# Patient Record
Sex: MALE | Race: WHITE | NOT HISPANIC OR LATINO | Employment: OTHER | URBAN - METROPOLITAN AREA
[De-identification: names, ages, dates, MRNs, and addresses within clinical notes are randomized per-mention and may not be internally consistent; named-entity substitution may affect disease eponyms.]

---

## 2017-06-13 ENCOUNTER — OFFICE VISIT (OUTPATIENT)
Dept: URGENT CARE | Facility: MEDICAL CENTER | Age: 52
End: 2017-06-13

## 2017-06-13 ENCOUNTER — APPOINTMENT (OUTPATIENT)
Dept: RADIOLOGY | Facility: MEDICAL CENTER | Age: 52
End: 2017-06-13
Attending: PHYSICIAN ASSISTANT

## 2017-06-13 DIAGNOSIS — M25.469 EFFUSION OF KNEE: ICD-10-CM

## 2017-06-13 PROCEDURE — G0382 LEV 3 HOSP TYPE B ED VISIT: HCPCS

## 2017-06-13 PROCEDURE — 73564 X-RAY EXAM KNEE 4 OR MORE: CPT

## 2018-08-29 ENCOUNTER — APPOINTMENT (EMERGENCY)
Dept: RADIOLOGY | Facility: HOSPITAL | Age: 53
End: 2018-08-29

## 2018-08-29 ENCOUNTER — HOSPITAL ENCOUNTER (EMERGENCY)
Facility: HOSPITAL | Age: 53
Discharge: HOME | End: 2018-08-29
Attending: EMERGENCY MEDICINE | Admitting: EMERGENCY MEDICINE

## 2018-08-29 VITALS
DIASTOLIC BLOOD PRESSURE: 74 MMHG | BODY MASS INDEX: 20.04 KG/M2 | SYSTOLIC BLOOD PRESSURE: 148 MMHG | WEIGHT: 140 LBS | HEIGHT: 70 IN | OXYGEN SATURATION: 99 % | TEMPERATURE: 98.3 F | RESPIRATION RATE: 20 BRPM | HEART RATE: 91 BPM

## 2018-08-29 DIAGNOSIS — S67.10XA CRUSH INJURY TO FINGER, INITIAL ENCOUNTER: ICD-10-CM

## 2018-08-29 DIAGNOSIS — S61.210A LACERATION OF RIGHT INDEX FINGER: Primary | ICD-10-CM

## 2018-08-29 PROCEDURE — 90471 IMMUNIZATION ADMIN: CPT

## 2018-08-29 PROCEDURE — 73140 X-RAY EXAM OF FINGER(S): CPT

## 2018-08-29 PROCEDURE — 90715 TDAP VACCINE 7 YRS/> IM: CPT | Performed by: EMERGENCY MEDICINE

## 2018-08-29 PROCEDURE — 99283 EMERGENCY DEPT VISIT LOW MDM: CPT

## 2018-08-29 RX ORDER — AMOXICILLIN AND CLAVULANATE POTASSIUM 875; 125 MG/1; MG/1
1 TABLET, FILM COATED ORAL EVERY 12 HOURS SCHEDULED
Qty: 20 TABLET | Refills: 0 | Status: SHIPPED | OUTPATIENT
Start: 2018-08-29 | End: 2018-09-08

## 2018-08-29 RX ORDER — GINSENG 100 MG
1 CAPSULE ORAL ONCE
Status: COMPLETED | OUTPATIENT
Start: 2018-08-29 | End: 2018-08-29

## 2018-08-29 RX ORDER — AMOXICILLIN AND CLAVULANATE POTASSIUM 875; 125 MG/1; MG/1
1 TABLET, FILM COATED ORAL ONCE
Status: COMPLETED | OUTPATIENT
Start: 2018-08-29 | End: 2018-08-29

## 2018-08-29 RX ORDER — BUPIVACAINE HYDROCHLORIDE 5 MG/ML
5 INJECTION, SOLUTION EPIDURAL; INTRACAUDAL ONCE
Status: COMPLETED | OUTPATIENT
Start: 2018-08-29 | End: 2018-08-29

## 2018-08-29 RX ORDER — LIDOCAINE HYDROCHLORIDE 10 MG/ML
5 INJECTION, SOLUTION EPIDURAL; INFILTRATION; INTRACAUDAL; PERINEURAL ONCE
Status: COMPLETED | OUTPATIENT
Start: 2018-08-29 | End: 2018-08-29

## 2018-08-29 RX ADMIN — BACITRACIN 1 SMALL APPLICATION: 500 OINTMENT TOPICAL at 12:51

## 2018-08-29 RX ADMIN — TETANUS TOXOID, REDUCED DIPHTHERIA TOXOID AND ACELLULAR PERTUSSIS VACCINE, ADSORBED 0.5 ML: 5; 2.5; 8; 8; 2.5 SUSPENSION INTRAMUSCULAR at 11:55

## 2018-08-29 RX ADMIN — LIDOCAINE HYDROCHLORIDE 5 ML: 10 INJECTION, SOLUTION EPIDURAL; INFILTRATION; INTRACAUDAL; PERINEURAL at 11:55

## 2018-08-29 RX ADMIN — BUPIVACAINE HYDROCHLORIDE 5 ML: 5 INJECTION, SOLUTION EPIDURAL; INTRACAUDAL at 11:56

## 2018-08-29 RX ADMIN — AMOXICILLIN AND CLAVULANATE POTASSIUM 1 TABLET: 875; 125 TABLET, FILM COATED ORAL at 12:50

## 2018-08-29 NOTE — ED PROVIDER NOTES
History  Chief Complaint   Patient presents with    Finger Laceration     Pt states he cut his R index finger while attempting to thread pipe  60-year-old male presents today complaining of a laceration on his right index finger  Finger was caught between a wrench and a machine that was threading pipe  No other injury  Occurred just prior to arrival   Tetanus status is unknown  Is not on any blood thinners  History provided by:  Patient  Finger Laceration   Location:  Finger  Finger laceration location:  R index finger  Length:  4  Bleeding: controlled with pressure    Laceration mechanism:  Metal edge  Pain details:     Quality:  Aching    Severity:  Moderate    Timing:  Constant    Progression:  Unchanged  Relieved by:  None tried  Worsened by:  Nothing  Ineffective treatments:  None tried  Tetanus status:  Out of date  Associated symptoms: no fever, no focal weakness, no numbness, no rash, no redness, no swelling and no streaking        None       History reviewed  No pertinent past medical history  History reviewed  No pertinent surgical history  History reviewed  No pertinent family history  I have reviewed and agree with the history as documented  Social History   Substance Use Topics    Smoking status: Current Every Day Smoker     Packs/day: 1 50     Types: Cigarettes    Smokeless tobacco: Never Used    Alcohol use No        Review of Systems   Constitutional: Negative for fever  Skin: Positive for wound  Negative for rash  Allergic/Immunologic: Negative for immunocompromised state  Neurological: Negative for focal weakness  Physical Exam  Physical Exam   Constitutional: He is oriented to person, place, and time  He appears well-developed and well-nourished  Musculoskeletal:   4 centimeter crescent shaped laceration on the dorsal surface of the right index finger over the PIP joint  Range of motion is limited due to pain  Neurovascularly intact distally  Neurological: He is alert and oriented to person, place, and time  Skin: Skin is warm and dry  Capillary refill takes less than 2 seconds  Psychiatric: He has a normal mood and affect  Nursing note and vitals reviewed  Vital Signs  ED Triage Vitals [08/29/18 1134]   Temperature Pulse Respirations Blood Pressure SpO2   98 3 °F (36 8 °C) 91 20 148/74 99 %      Temp Source Heart Rate Source Patient Position - Orthostatic VS BP Location FiO2 (%)   Oral Monitor Sitting Right arm --      Pain Score       4           Vitals:    08/29/18 1134   BP: 148/74   Pulse: 91   Patient Position - Orthostatic VS: Sitting       Visual Acuity      ED Medications  Medications   tetanus-diphtheria-acellular pertussis (BOOSTRIX) IM injection 0 5 mL (0 5 mL Intramuscular Given 8/29/18 1155)   lidocaine (PF) (XYLOCAINE-MPF) 1 % injection 5 mL (5 mL Infiltration Given 8/29/18 1155)   bupivacaine (PF) (MARCAINE) 0 5 % injection 5 mL (5 mL Injection Given 8/29/18 1156)   amoxicillin-clavulanate (AUGMENTIN) 875-125 mg per tablet 1 tablet (1 tablet Oral Given 8/29/18 1250)   bacitracin topical ointment 1 small application (1 small application Topical Given 8/29/18 1251)       Diagnostic Studies  Results Reviewed     None                 XR finger second digit-index RIGHT   Final Result by Duarte Weber MD (08/29 1310)      Swelling  Workstation performed: [id number]                    Procedures  Lac Repair  Date/Time: 8/29/2018 12:24 PM  Performed by: Merlyn Ormond  Authorized by: Merlyn Ormond   Consent: Verbal consent obtained    Risks and benefits: risks, benefits and alternatives were discussed  Consent given by: patient  Patient understanding: patient states understanding of the procedure being performed  Patient identity confirmed: verbally with patient  Body area: upper extremity  Location details: right index finger  Laceration length: 4 cm  Foreign bodies: no foreign bodies  Anesthesia: digital block    Anesthesia:  Local Anesthetic: bupivacaine 0 5% without epinephrine and lidocaine 1% without epinephrine    Sedation:  Patient sedated: no    Wound Dehiscence:  Superficial Wound Dehiscence: simple closure      Procedure Details:  Irrigation solution: saline  Irrigation method: syringe  Amount of cleaning: standard  Debridement: none  Degree of undermining: none  Skin closure: 5-0 nylon  Number of sutures: 5  Technique: simple  Approximation: close  Approximation difficulty: simple  Dressing: antibiotic ointment  Patient tolerance: Patient tolerated the procedure well with no immediate complications             Phone Contacts  ED Phone Contact    ED Course                               MDM  Number of Diagnoses or Management Options  Crush injury to finger, initial encounter: new and requires workup  Laceration of right index finger: new and requires workup  Diagnosis management comments: No obvious fracture seen on x-ray by my read  Due to the deep laceration as result of a crush injury over the joint will cover with antibiotics would place a splint  Will recommend follow-up with Hand surgery as an outpatient  Return in 7-10 days for suture removal if unable to secure follow-up with Hand surgery         Amount and/or Complexity of Data Reviewed  Tests in the radiology section of CPT®: ordered and reviewed    Risk of Complications, Morbidity, and/or Mortality  Presenting problems: moderate  Diagnostic procedures: moderate  Management options: moderate    Patient Progress  Patient progress: stable    CritCare Time    Disposition  Final diagnoses:   Laceration of right index finger   Crush injury to finger, initial encounter     Time reflects when diagnosis was documented in both MDM as applicable and the Disposition within this note     Time User Action Codes Description Comment    8/29/2018 12:48 PM Aron Doshi Add [K56 [id number]] Laceration of right index finger     8/29/2018 12:52 PM Minh Traore [[id number]] Crush injury to finger, initial encounter       ED Disposition     ED Disposition Condition Comment    Discharge  [address] discharge to home/self care  Condition at discharge: Stable        Follow-up Information     Follow up With Specialties Details Why Contact Info Additional Information    Pema Lai MD Orthopedic Surgery, Orthopedics Schedule an appointment as soon as possible for a visit for wound recheck/hand specialist [address] Emergency Department Emergency Medicine  For suture removal if unable to obtain appointment with hand surgeon [address] ED, [address] [place], [place], [address]          Discharge Medication List as of 8/29/2018 12:52 PM      START taking these medications    Details   amoxicillin-clavulanate (AUGMENTIN) 875-125 mg per tablet Take 1 tablet by mouth every 12 (twelve) hours for 10 days, Starting Wed 8/29/2018, Until Sat 9/8/2018, Print           No discharge procedures on file      ED Provider  Electronically Signed by           French Melgar DO  08/29/18 7246

## 2018-08-29 NOTE — DISCHARGE INSTRUCTIONS
Crush Injury   WHAT YOU NEED TO KNOW:   A crush injury happens when part of your body is trapped under a heavy object, or trapped between objects  You may have one or more broken bones  You may also have tissue damage  The damage can cause pain, numbness, and weakness  A crush injury can cause serious problems that need immediate treatment  DISCHARGE INSTRUCTIONS:   Medicines: You may need any of the following:  · Prescription pain medicine  may be given  Ask how to take this medicine safely  · Antibiotics  prevent or fight a bacterial infection  · Take your medicine as directed  Contact your healthcare provider if you think your medicine is not helping or if you have side effects  Tell him of her if you are allergic to any medicine  Keep a list of the medicines, vitamins, and herbs you take  Include the amounts, and when and why you take them  Bring the list or the pill bottles to follow-up visits  Carry your medicine list with you in case of an emergency  Call 911 for any of the following:   · You have chest pain, shortness of breath, or cannot think clearly  Return to the emergency department if:   · The skin near the injured area turns blue or white or feels cold and numb  · You feel pain that increases when you stretch or bend the injured area  · The injured area swells or feels tight or hard  · You have pale or shiny skin near your injury  · You have numbness or trouble moving your injured arm or leg  · Your wound is draining pus or smells bad  · Your pain or swelling does not go away or gets worse, even after you take medicine  · Blood soaks through your bandage or cast   Contact your healthcare provider if:   · You have questions or concerns about your condition or care  Follow up with your healthcare provider as directed: You may need more x-rays, or a cast for a broken bone  You may also need treatment for muscle, nerve, or kidney damage   Your healthcare provider may refer you to an orthopedic surgeon or other specialist  Write down your questions so you remember to ask them during your visits  Apply ice:  Ice helps decrease pain and swelling  Ice may also help decrease tissue damage  Use an ice pack, or put crushed ice in a plastic bag  Cover it with a towel  Apply it to the injured area for 20 minutes every hour, or as directed  Ask your healthcare provider how many times each day to apply ice, and for how many days  Elevate the injured area as directed: If possible, raise the area as often as you can  This will help decrease swelling and pain  Prop it on pillows to keep it elevated comfortably  Do not smoke:  Smoking can cause tissue damage and delay healing  Ask your healthcare provider for more information if you currently smoke and need help quitting  Go to therapy as directed:  A physical therapist can teach you exercises to help improve movement and strength  Physical therapy can also help decrease pain and loss of function  An occupational therapist can help you find ways to do daily activities and care for yourself  © 2017 [facility] Information is for End User's use only and may not be sold, redistributed or otherwise used for commercial purposes  All illustrations and images included in CareNotes® are the copyrighted property of [employer]  or [employer]  The above information is an  only  It is not intended as medical advice for individual conditions or treatments  Talk to your doctor, nurse or pharmacist before following any medical regimen to see if it is safe and effective for you  Finger Laceration   WHAT YOU NEED TO KNOW:   A finger laceration is a deep cut in your skin  It is often caused by a sharp object, such as a knife, or blunt force to your finger  Your blood vessels, bones, joints, tendons, or nerves may also be injured     DISCHARGE INSTRUCTIONS:   Return to the emergency department if:   · Your wound comes apart  · Blood soaks through your bandage  · You have severe pain in your finger or hand  · Your finger is pale and cold  · You have sudden trouble moving your finger  · Your swelling suddenly gets worse  · You have red streaks on your skin coming from your wound  Contact your healthcare provider or hand specialist if:   · You have new numbness or tingling  · Your finger feels warm, looks swollen or red, and is draining pus  · You have a fever  · You have questions or concerns about your condition or care  Medicines: You may  need any of the following:  · Antibiotics  help prevent a bacterial infection  · Acetaminophen  decreases pain and fever  It is available without a doctor's order  Ask how much to take and how often to take it  Follow directions  Read the labels of all other medicines you are using to see if they also contain acetaminophen, or ask your doctor or pharmacist  Acetaminophen can cause liver damage if not taken correctly  Do not use more than 4 grams (4,000 milligrams) total of acetaminophen in one day  · Prescription pain medicine  may be given  Ask your healthcare provider how to take this medicine safely  Some prescription pain medicines contain acetaminophen  Do not take other medicines that contain acetaminophen without talking to your healthcare provider  Too much acetaminophen may cause liver damage  Prescription pain medicine may cause constipation  Ask your healthcare provider how to prevent or treat constipation  · Take your medicine as directed  Contact your healthcare provider if you think your medicine is not helping or if you have side effects  Tell him or her if you are allergic to any medicine  Keep a list of the medicines, vitamins, and herbs you take  Include the amounts, and when and why you take them  Bring the list or the pill bottles to follow-up visits   Carry your medicine list with you in case of an emergency  Self-care:   · Apply ice  on your finger for 15 to 20 minutes every hour or as directed  Use an ice pack, or put crushed ice in a plastic bag  Cover it with a towel before you apply it to your skin  Ice helps prevent tissue damage and decreases swelling and pain  · Elevate  your hand above the level of your heart as often as you can  This will help decrease swelling and pain  Prop your hand on pillows or blankets to keep it elevated comfortably  · Wear your splint as directed  A splint will decrease movement and stress on your wound  The splint may help your wound heal faster  Ask your healthcare provider how to apply and remove a splint  · Apply ointments to decrease scarring  Do not apply ointments until your healthcare provider says it is okay  You may need to wait until your wound is healed  Ask which ointment to buy and how often to use it  Wound care:   · Do not get your wound wet until your healthcare provider says it is okay  Do not soak your hand in water  Do not go swimming until your healthcare provider says it is okay  When your healthcare provider says it is okay, carefully wash around the wound with soap and water  Let soap and water run over your wound  Gently pat the area dry or allow it to air dry  · Change your bandages when they get wet, dirty, or after washing  Apply new, clean bandages as directed  Do not apply elastic bandages or tape too tightly  Do not put powders or lotions on your wound  · Apply antibiotic ointment as directed  Your healthcare provider may give you antibiotic ointment to put over your wound if you have stitches  If you have Strips-Strips over your wound, let them dry up and fall off on their own  If they do not fall off within 14 days, gently remove them  If you have glue over your wound, do not remove or pick at it  If your glue comes off, do not replace it with glue that you have at home       · Check your wound every day for signs of infection  Signs of infection include swelling, redness, or pus  Follow up with your healthcare provider or hand specialist in 2 days:  Write down your questions so you remember to ask them during your visits  © 2017 [address] Information is for End User's use only and may not be sold, redistributed or otherwise used for commercial purposes  All illustrations and images included in CareNotes® are the copyrighted property of [employer]  or [employer]  The above information is an  only  It is not intended as medical advice for individual conditions or treatments  Talk to your doctor, nurse or pharmacist before following any medical regimen to see if it is safe and effective for you

## 2020-05-08 ENCOUNTER — HOSPITAL ENCOUNTER (OUTPATIENT)
Dept: RADIOLOGY | Facility: HOSPITAL | Age: 55
Discharge: HOME | End: 2020-05-08
Payer: COMMERCIAL

## 2020-05-08 ENCOUNTER — TRANSCRIBE ORDERS (OUTPATIENT)
Dept: ADMINISTRATIVE | Facility: HOSPITAL | Age: 55
End: 2020-05-08

## 2020-05-08 ENCOUNTER — APPOINTMENT (OUTPATIENT)
Dept: LAB | Facility: HOSPITAL | Age: 55
End: 2020-05-08
Payer: COMMERCIAL

## 2020-05-08 ENCOUNTER — LAB (OUTPATIENT)
Dept: LAB | Facility: HOSPITAL | Age: 55
End: 2020-05-08
Payer: COMMERCIAL

## 2020-05-08 DIAGNOSIS — R13.10 ODYNOPHAGIA: ICD-10-CM

## 2020-05-08 DIAGNOSIS — R22.1 NECK MASS: ICD-10-CM

## 2020-05-08 DIAGNOSIS — J38.3 VOCAL CORD LEUKOPLAKIA: ICD-10-CM

## 2020-05-08 DIAGNOSIS — R13.10 ODYNOPHAGIA: Primary | ICD-10-CM

## 2020-05-08 LAB
ALBUMIN SERPL BCP-MCNC: 4 G/DL (ref 3.5–5)
ANION GAP SERPL CALCULATED.3IONS-SCNC: 11 MMOL/L (ref 4–13)
BUN SERPL-MCNC: 15 MG/DL (ref 5–25)
CALCIUM ALBUM COR SERPL-MCNC: 10.4 MG/DL (ref 8.3–10.1)
CALCIUM SERPL-MCNC: 10.4 MG/DL (ref 8.3–10.1)
CALCIUM SERPL-MCNC: 10.4 MG/DL (ref 8.3–10.1)
CHLORIDE SERPL-SCNC: 105 MMOL/L (ref 100–108)
CO2 SERPL-SCNC: 29 MMOL/L (ref 21–32)
CREAT SERPL-MCNC: 1.01 MG/DL (ref 0.6–1.3)
ERYTHROCYTE [DISTWIDTH] IN BLOOD BY AUTOMATED COUNT: 14 % (ref 11.6–15.1)
GFR SERPL CREATININE-BSD FRML MDRD: 84 ML/MIN/1.73SQ M
GLUCOSE P FAST SERPL-MCNC: 104 MG/DL (ref 65–99)
HCT VFR BLD AUTO: 50.3 % (ref 36.5–49.3)
HGB BLD-MCNC: 16.3 G/DL (ref 12–17)
INR PPP: 0.92 (ref 0.84–1.19)
MCH RBC QN AUTO: 30.9 PG (ref 26.8–34.3)
MCHC RBC AUTO-ENTMCNC: 32.4 G/DL (ref 31.4–37.4)
MCV RBC AUTO: 95 FL (ref 82–98)
PLATELET # BLD AUTO: 226 THOUSANDS/UL (ref 149–390)
PMV BLD AUTO: 11.5 FL (ref 8.9–12.7)
POTASSIUM SERPL-SCNC: 4.7 MMOL/L (ref 3.5–5.3)
PROTHROMBIN TIME: 12.4 SECONDS (ref 11.6–14.5)
RBC # BLD AUTO: 5.27 MILLION/UL (ref 3.88–5.62)
SODIUM SERPL-SCNC: 145 MMOL/L (ref 136–145)
WBC # BLD AUTO: 7.3 THOUSAND/UL (ref 4.31–10.16)

## 2020-05-08 PROCEDURE — 36415 COLL VENOUS BLD VENIPUNCTURE: CPT

## 2020-05-08 PROCEDURE — 80048 BASIC METABOLIC PNL TOTAL CA: CPT

## 2020-05-08 PROCEDURE — 85027 COMPLETE CBC AUTOMATED: CPT

## 2020-05-08 PROCEDURE — 82040 ASSAY OF SERUM ALBUMIN: CPT

## 2020-05-08 PROCEDURE — 71046 X-RAY EXAM CHEST 2 VIEWS: CPT

## 2020-05-08 PROCEDURE — 85610 PROTHROMBIN TIME: CPT

## 2020-05-08 PROCEDURE — 93005 ELECTROCARDIOGRAM TRACING: CPT

## 2020-05-12 LAB
ATRIAL RATE: 94 BPM
P AXIS: 60 DEGREES
PR INTERVAL: 124 MS
QRS AXIS: 74 DEGREES
QRSD INTERVAL: 86 MS
QT INTERVAL: 340 MS
QTC INTERVAL: 425 MS
T WAVE AXIS: 52 DEGREES
VENTRICULAR RATE: 94 BPM

## 2020-05-12 PROCEDURE — 93010 ELECTROCARDIOGRAM REPORT: CPT | Performed by: INTERNAL MEDICINE

## 2020-09-13 ENCOUNTER — APPOINTMENT (EMERGENCY)
Dept: RADIOLOGY | Facility: HOSPITAL | Age: 55
End: 2020-09-13
Payer: COMMERCIAL

## 2020-09-13 ENCOUNTER — HOSPITAL ENCOUNTER (EMERGENCY)
Facility: HOSPITAL | Age: 55
Discharge: HOME | End: 2020-09-13
Attending: EMERGENCY MEDICINE
Payer: COMMERCIAL

## 2020-09-13 VITALS
DIASTOLIC BLOOD PRESSURE: 62 MMHG | OXYGEN SATURATION: 100 % | HEIGHT: 70 IN | TEMPERATURE: 98 F | WEIGHT: 140 LBS | SYSTOLIC BLOOD PRESSURE: 120 MMHG | RESPIRATION RATE: 18 BRPM | HEART RATE: 99 BPM | BODY MASS INDEX: 20.04 KG/M2

## 2020-09-13 DIAGNOSIS — S60.221A CONTUSION OF RIGHT HAND, INITIAL ENCOUNTER: Primary | ICD-10-CM

## 2020-09-13 DIAGNOSIS — M25.511 ACUTE PAIN OF RIGHT SHOULDER: ICD-10-CM

## 2020-09-13 PROCEDURE — 73030 X-RAY EXAM OF SHOULDER: CPT

## 2020-09-13 PROCEDURE — 99283 EMERGENCY DEPT VISIT LOW MDM: CPT

## 2020-09-13 PROCEDURE — 73130 X-RAY EXAM OF HAND: CPT

## 2020-09-13 PROCEDURE — 99284 EMERGENCY DEPT VISIT MOD MDM: CPT | Performed by: PHYSICIAN ASSISTANT

## 2020-09-13 RX ORDER — OXYCODONE HYDROCHLORIDE AND ACETAMINOPHEN 5; 325 MG/1; MG/1
1 TABLET ORAL EVERY 6 HOURS PRN
Qty: 15 TABLET | Refills: 0 | Status: SHIPPED | OUTPATIENT
Start: 2020-09-13 | End: 2020-09-29

## 2020-09-13 RX ORDER — MELOXICAM 15 MG/1
15 TABLET ORAL DAILY PRN
Qty: 20 TABLET | Refills: 0 | Status: SHIPPED | OUTPATIENT
Start: 2020-09-13 | End: 2020-09-29

## 2020-09-13 NOTE — ED PROVIDER NOTES
History  Chief Complaint   Patient presents with    Shoulder Injury     pt states he fell on his right shoulder a few weeks ago  pt tates he has limited ROM  he ha been taking motrin with no relief   Hand Injury     c/o hand swelling, a bruise and pain since the fall      48 y o  male with past medical history significant for throat and neck surgery presents to ED with chief complaint of right shoulder and right hand injury  Onset of symptoms reported as 1 week ago  Location of symptoms reported as right shoulder and right hand  Quality is reported as sharp pain  Severity is reported as moderate  Associated symptoms:  Denies paralysis paresthesias or weakness to the right upper extremity  Denies chest pain  Denies shortness of breath  Denies joint redness or swelling  Modifying factors: Movement at the shoulder, specifically abduction and extension exacerbate pain  Context:  Patient states 1 week ago he was pulling a vine out of a tree and was pulling on it with a lot of force and when the vine let go he fell to the ground on his right arm and right hand  He denies head injury or loss of consciousness  He denies use of blood thinners  He is right-hand dominant  He states he has been trying over-the-counter medications but states the pain in his shoulders getting worse and he is concerned that he has suffered a more severe injury  He states it is affecting his ability to work  Reviewed past medical history and visits via EPIC:  Old charts reviewed - patient last seen in ed on 08/29/2018 for evaluation of finger laceration        History provided by:  Patient   used: No    Shoulder Injury   Associated symptoms: no back pain, no fatigue, no fever and no neck pain    Hand Injury   Associated symptoms: no back pain, no fatigue, no fever and no neck pain        None       History reviewed  No pertinent past medical history      Past Surgical History:   Procedure Laterality Date    NECK SURGERY      THROAT SURGERY         History reviewed  No pertinent family history  I have reviewed and agree with the history as documented  E-Cigarette/Vaping    E-Cigarette Use Never User      E-Cigarette/Vaping Substances     Social History     Tobacco Use    Smoking status: Current Every Day Smoker     Packs/day: 1 50     Types: Cigarettes    Smokeless tobacco: Never Used   Substance Use Topics    Alcohol use: No    Drug use: No       Review of Systems   Constitutional: Negative for activity change, appetite change, chills, diaphoresis, fatigue, fever and unexpected weight change  HENT: Negative for congestion, dental problem, drooling, ear discharge, ear pain, facial swelling, hearing loss, mouth sores, nosebleeds, postnasal drip, rhinorrhea, sinus pressure, sinus pain, sneezing, sore throat, tinnitus, trouble swallowing and voice change  Eyes: Negative for photophobia, pain, discharge, redness, itching and visual disturbance  Respiratory: Negative for apnea, cough, choking, chest tightness, shortness of breath, wheezing and stridor  Cardiovascular: Negative for chest pain, palpitations and leg swelling  Gastrointestinal: Negative for abdominal distention, abdominal pain, anal bleeding, blood in stool, constipation, diarrhea, nausea and vomiting  Endocrine: Negative for cold intolerance, heat intolerance, polydipsia, polyphagia and polyuria  Genitourinary: Negative for difficulty urinating, dysuria, flank pain, frequency, hematuria and urgency  Musculoskeletal: Positive for arthralgias  Negative for back pain, joint swelling, myalgias, neck pain and neck stiffness  Skin: Negative for color change, pallor, rash and wound  Allergic/Immunologic: Negative for environmental allergies, food allergies and immunocompromised state     Neurological: Negative for dizziness, tremors, seizures, syncope, facial asymmetry, speech difficulty, weakness, light-headedness, numbness and headaches  Hematological: Negative for adenopathy  Does not bruise/bleed easily  Psychiatric/Behavioral: Negative for agitation, confusion and hallucinations  The patient is not nervous/anxious  All other systems reviewed and are negative  Physical Exam  Physical Exam  Vitals signs and nursing note reviewed  Constitutional:       General: He is not in acute distress  Appearance: Normal appearance  He is well-developed  He is not diaphoretic  Comments: /62   Pulse 99   Temp 98 °F (36 7 °C) (Oral)   Resp 18   Ht 5' 10" (1 778 m)   Wt 63 5 kg (140 lb)   SpO2 100%   BMI 20 09 kg/m²    HENT:      Head: Normocephalic and atraumatic  Right Ear: External ear normal       Left Ear: External ear normal       Nose: Nose normal  No congestion or rhinorrhea  Mouth/Throat:      Pharynx: No oropharyngeal exudate or posterior oropharyngeal erythema  Eyes:      General: No scleral icterus  Right eye: No discharge  Left eye: No discharge  Conjunctiva/sclera: Conjunctivae normal       Pupils: Pupils are equal, round, and reactive to light  Neck:      Musculoskeletal: Normal range of motion and neck supple  No neck rigidity or muscular tenderness  Thyroid: No thyromegaly  Trachea: No tracheal deviation  Cardiovascular:      Rate and Rhythm: Normal rate and regular rhythm  Pulses: Normal pulses  Pulmonary:      Effort: Pulmonary effort is normal  No respiratory distress  Breath sounds: Normal breath sounds  No stridor  No wheezing, rhonchi or rales  Chest:      Chest wall: No tenderness  Abdominal:      General: Bowel sounds are normal  There is no distension  Palpations: Abdomen is soft  There is no mass  Tenderness: There is no abdominal tenderness  There is no right CVA tenderness, left CVA tenderness, guarding or rebound  Hernia: No hernia is present     Musculoskeletal:         General: Tenderness and signs of injury present  No swelling or deformity  Right shoulder: He exhibits decreased range of motion, tenderness, bony tenderness and pain  He exhibits no swelling, no effusion, no crepitus, no deformity, no laceration, no spasm, normal pulse and normal strength  Arms:       Right hand: He exhibits tenderness, bony tenderness and swelling  He exhibits normal range of motion, normal two-point discrimination, normal capillary refill, no deformity and no laceration  Normal sensation noted  Normal strength noted  Hands:       Right lower leg: No edema  Left lower leg: No edema  Lymphadenopathy:      Cervical: No cervical adenopathy  Skin:     General: Skin is warm and dry  Capillary Refill: Capillary refill takes less than 2 seconds  Coloration: Skin is not jaundiced or pale  Findings: No bruising, erythema, lesion or rash  Neurological:      General: No focal deficit present  Mental Status: He is alert and oriented to person, place, and time  Cranial Nerves: No cranial nerve deficit  Sensory: No sensory deficit  Motor: No weakness or abnormal muscle tone  Coordination: Coordination normal       Gait: Gait normal       Deep Tendon Reflexes: Reflexes normal    Psychiatric:         Mood and Affect: Mood normal          Behavior: Behavior normal          Thought Content:  Thought content normal          Judgment: Judgment normal          Vital Signs  ED Triage Vitals [09/13/20 1014]   Temperature Pulse Respirations Blood Pressure SpO2   98 °F (36 7 °C) 100 18 127/73 99 %      Temp Source Heart Rate Source Patient Position - Orthostatic VS BP Location FiO2 (%)   Oral Monitor -- -- --      Pain Score       6           Vitals:    09/13/20 1014 09/13/20 1030   BP: 127/73 120/62   Pulse: 100 99         Visual Acuity      ED Medications  Medications - No data to display    Diagnostic Studies  Results Reviewed     None                 XR shoulder 2+ views RIGHT   Final Result by Melyssa Alcala MD (09/13 1107)      Calcific tendinitis/bursitis  No acute osseous abnormality  Workstation performed: [id number]         XR hand 3+ views RIGHT   Final Result by Melyssa Alcala MD (09/13 1109)      No acute osseous abnormality  Workstation performed: [id number]                    Procedures  Procedures         ED Course  ED Course as of Sep 13 1501   Sun Sep 13, 2020   1048 Patient drove himself to ED  Offered analgesics in ed but patient declined  SBIRT 22yo+      Most Recent Value   SBIRT (24 yo +)   In order to provide better care to our patients, we are screening all of our patients for alcohol and drug use  Would it be okay to ask you these screening questions? No Filed at: 09/13/2020 1016   Initial Alcohol Screen: US AUDIT-C    1  How often do you have a drink containing alcohol?  0 Filed at: 09/13/2020 1016   2  How many drinks containing alcohol do you have on a typical day you are drinking? 0 Filed at: 09/13/2020 1016   3a  Male UNDER 65: How often do you have five or more drinks on one occasion? 0 Filed at: 09/13/2020 1016   3b  FEMALE Any Age, or MALE 65+: How often do you have 4 or more drinks on one occassion? 0 Filed at: 09/13/2020 1016   Audit-C Score  0 Filed at: 09/13/2020 1016   ASHLEY: How many times in the past year have you    Used an illegal drug or used a prescription medication for non-medical reasons? Never Filed at: 09/13/2020 1016                  MDM  Number of Diagnoses or Management Options  Acute pain of right shoulder: new and requires workup  Contusion of right hand, initial encounter: new and requires workup  Diagnosis management comments: ddx includes but is not limited to fracture, contusion, sprain, strain, nerve injury, tendon injury, vascular injury, tendinitis, bursitis, dislocation, plan xray to rule out fracture or dislocation      Xray images right hand independently visualized and interpreted by me - no acute fracture or dislocation  Xray images right shoulder independently visualized and interpreted by me - no acute fracture or dislocation  There is no acute fracture or dislocation  Right AC mild degenerative joint     No lytic or blastic osseous lesion  Inferior glenoid soft tissue calcifications  I discussed x-ray results with the patient at bedside  Discussed with patient the in for glenoid soft tissue calcification this concerning as a source for the patient's symptoms  Discussed no fracture of the right hand  Discussed diagnosis of contusion of the right hand  Discussed diagnosis of right shoulder pain  Discussed treatment plan including rest, use of analgesic pain medications  Discussed use of NSAIDs  Discussed use of ice  Discussed follow-up with primary care physician and Sports Medicine in 2-3 days for recheck and further evaluation of symptoms  Reviewed reasons to return to ed  Patient verbalized understanding of diagnosis and agreement with discharge plan of care as well as understanding of reasons to return to ed  I have reasonably determine that electronically prescribing a controlled substance would be impractical for the patient to obtain the controlled substance prescribed by electronic prescription or would cause an untimely delay resulting in an adverse impact on the patient's medical condition   Standard narcotic precautions given            Amount and/or Complexity of Data Reviewed  Tests in the radiology section of CPT®: ordered and reviewed  Discussion of test results with the performing providers: yes  Obtain history from someone other than the patient: yes  Review and summarize past medical records: yes  Independent visualization of images, tracings, or specimens: yes    Patient Progress  Patient progress: stable      Disposition  Final diagnoses:   Contusion of right hand, initial encounter   Acute pain of right shoulder     Time reflects when diagnosis was documented in both MDM as applicable and the Disposition within this note     Time User Action Codes Description Comment    9/13/2020 11:17 AM Aysha Sargent [F02 193A] Contusion of right hand, initial encounter     9/13/2020 11:17 AM Aysha Sargent [E29 [id number]] Acute pain of right shoulder       ED Disposition     ED Disposition Condition Date/Time Comment    Discharge Stable Sun Sep 13, 2020 11:17 AM Tasia Calle discharge to home/self care  Follow-up Information     Follow up With Specialties Details Why Contact Info Additional [address] [facility] Emergency Department Emergency Medicine Go to  If symptoms worsen [address] [facility] [address] ED, [address] [place], [place], [address] Shakila York MD Family Medicine Call in 1 day for further evaluation of symptoms [address]   [address]  [facility] 41, [address] Call in 1 day for further evaluation of symptoms [address] [facility]  [address]  [facility] 89  [phone]             Discharge Medication List as of 9/13/2020 11:20 AM      START taking these medications    Details   meloxicam (MOBIC) 15 mg tablet Take 1 tablet (15 mg total) by mouth daily as needed for moderate pain (shoulder pain/initial rx ), Starting Sun 9/13/2020, Print      oxyCODONE-acetaminophen (PERCOCET) 5-325 mg per tablet Take 1 tablet by mouth every 6 (six) hours as needed for severe pain (shoulder pain/initial rx ) Label no driving no etoh  Initial rx  Dx:Max Daily Amount: 4 tablets, Starting Sun 9/13/2020, Print           No discharge procedures on file      PDMP Review     None          ED Provider  Electronically Signed by           Christofer Bruce PA-C  09/13/20 1742

## 2020-09-13 NOTE — Clinical Note
Danica Rahman was seen and treated in our emergency department on 9/13/2020             no use or right arm until cleared by orthopedics  Diagnosis:     Luisa Monzon  may return to work on return date  He may return on this date: 09/14/2020         If you have any questions or concerns, please don't hesitate to call        Angelica Terrell PA-C    ______________________________           _______________          _______________  Hospital Representative                              Date                                Time

## 2020-09-19 ENCOUNTER — TELEPHONE (OUTPATIENT)
Dept: OBGYN CLINIC | Facility: HOSPITAL | Age: 55
End: 2020-09-19

## 2020-09-19 ENCOUNTER — TELEPHONE (OUTPATIENT)
Dept: OTHER | Facility: OTHER | Age: 55
End: 2020-09-19

## 2020-09-19 ENCOUNTER — OFFICE VISIT (OUTPATIENT)
Dept: OBGYN CLINIC | Facility: CLINIC | Age: 55
End: 2020-09-19
Payer: COMMERCIAL

## 2020-09-19 VITALS
BODY MASS INDEX: 19.61 KG/M2 | HEART RATE: 90 BPM | WEIGHT: 137 LBS | SYSTOLIC BLOOD PRESSURE: 115 MMHG | HEIGHT: 70 IN | TEMPERATURE: 98.8 F | DIASTOLIC BLOOD PRESSURE: 74 MMHG

## 2020-09-19 DIAGNOSIS — Z13.89 ENCOUNTER FOR IMAGING TO SCREEN FOR METAL PRIOR TO MRI: ICD-10-CM

## 2020-09-19 DIAGNOSIS — S49.91XA INJURY OF GLENOID LABRUM, RIGHT, INITIAL ENCOUNTER: Primary | ICD-10-CM

## 2020-09-19 PROCEDURE — 99203 OFFICE O/P NEW LOW 30 MIN: CPT | Performed by: FAMILY MEDICINE

## 2020-09-19 RX ORDER — IBUPROFEN 800 MG/1
800 TABLET ORAL 3 TIMES DAILY PRN
Qty: 90 TABLET | Refills: 1 | Status: SHIPPED | OUTPATIENT
Start: 2020-09-19 | End: 2020-11-30

## 2020-09-19 RX ORDER — TRAMADOL HYDROCHLORIDE 50 MG/1
50 TABLET ORAL 2 TIMES DAILY PRN
Qty: 10 TABLET | Refills: 0 | Status: SHIPPED | OUTPATIENT
Start: 2020-09-19 | End: 2020-11-30

## 2020-09-19 RX ORDER — OXYCODONE HYDROCHLORIDE AND ACETAMINOPHEN 5; 325 MG/1; MG/1
1 TABLET ORAL 2 TIMES DAILY PRN
Qty: 10 TABLET | Refills: 0 | Status: SHIPPED | OUTPATIENT
Start: 2020-09-19 | End: 2020-09-25 | Stop reason: SDUPTHER

## 2020-09-19 NOTE — TELEPHONE ENCOUNTER
Please advise patient that Rx for Tramadol sent to pharmacy, until prior auth can be done      Thanks

## 2020-09-19 NOTE — TELEPHONE ENCOUNTER
Patient sees Dr Umair Shahid  Patients spouse Sincere Deshpande is calling in stating that he was prescribed Oxycodone 5-325 mg tablets  She is stating that the pharmacy is advising that we need to contact the patients insurance to get an authorization for this medication  Patient is not going to be given the medication until authorization is obtained, she is asking for a call back relating this        Call back# [phone]

## 2020-09-19 NOTE — TELEPHONE ENCOUNTER
PHONE# [phone]/ Casey Ontiveros from [address] calling about PT: Benita Calle/ : [phone]/ FOR medication (OXYCODONE AND TRAMADOL) authorization/ Thank you

## 2020-09-19 NOTE — PROGRESS NOTES
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 and instability following axial load injury 4 weeks ago  Discussed with patient physical exam, radiographs, impression and plan  X-rays the right shoulder noted for calcifications within the subacromial space, as well as the inferior aspect of the glenohumeral joint  He has tenderness along the trapezius and anterior aspect of the shoulder  He has range of motion limited to forward flexion of 90°, abduction 90°, and internal rotation to lumbar spine  He has 4/5 strength external rotation and supraspinatus  There is positive Thurman maneuver, positive Ogemaw's test, positive axial load and grind, and positive empty can test   Based on mechanism of injury, subsequent symptoms, and clinical exam there is concern for internal derangement the shoulder particularly labral tear and occult fracture  At this time I will refer for MRI arthrogram of the shoulder as invasive management may be warranted  He will follow up with me after getting MRI done  Subjective:   Patient ID: Halle Ma is a 48 y o  male  Chief Complaint   Patient presents with    Right Shoulder - Pain       60-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 for evaluation of right shoulder pain and instability of onset from injury more than 4 weeks ago    He reports pulling on a vine on a tree, and then following landing on his right shoulder  He had pain that was described as sudden in onset, generalized to the shoulder worse at the anterior aspect, throbbing and sharp, radiating distally along the anterior lateral aspect the upper arm, worse with movement of the arm, associated with numbness and tingling, associated clicking, and improved with resting and return to neutral position  He has history of prior labral tear from martial arts injury more than 10 years ago which he treated conservatively with formal physical therapy  After this recent injury he resumed his home exercise program and did stretching, icing, heat, ibuprofen  His symptoms persisted and he presented to emergency room at which point x-ray evaluation was noted for calcifications of the subacromial and inferior glenohumeral aspect of the shoulder  There is suspicion for soft tissue injury  He was prescribed medications for pain and referred to orthopedic care  He has had difficulty with elevating the arm above shoulder and experiences clicking and sharp pain with movement  Shoulder Pain   This is a new problem  The current episode started 1 to 4 weeks ago  The problem occurs constantly  The problem has been unchanged  Associated symptoms include arthralgias, numbness and weakness  Pertinent negatives include no abdominal pain, chest pain, chills, fever, joint swelling, rash or sore throat  Exacerbated by: Arm movement  He has tried rest, NSAIDs, oral narcotics, ice, heat, acetaminophen and position changes (Home exercise, stretching) for the symptoms  The treatment provided mild relief  The following portions of the patient's history were reviewed and updated as appropriate: He  has no past medical history on file  He  has a past surgical history that includes Throat surgery and Neck surgery  His family history is not on file  He  reports that he has been smoking cigarettes   He has been smoking about 1 50 packs per day  He has never used smokeless tobacco  He reports that he does not drink alcohol or use drugs  He has No Known Allergies       Review of Systems   Constitutional: Negative for chills and fever  HENT: Negative for sore throat  Eyes: Negative for visual disturbance  Respiratory: Negative for shortness of breath  Cardiovascular: Negative for chest pain  Gastrointestinal: Negative for abdominal pain  Genitourinary: Negative for flank pain  Musculoskeletal: Positive for arthralgias  Negative for joint swelling  Skin: Negative for rash and wound  Neurological: Positive for weakness and numbness  Hematological: Does not bruise/bleed easily  Psychiatric/Behavioral: Negative for self-injury  Objective:  Vitals:    09/19/20 0847   BP: 115/74   Pulse: 90   Temp: 98 8 °F (37 1 °C)   Weight: 62 1 kg (137 lb)   Height: 5' 10" (1 778 m)     Back Exam     Comments:    Cervical spine  -bilateral paraspinal tenderness  -limited range of motion with extension and side bending to both sides  -positive axial load  -positive Spurling's on the right      Right Hand Exam     Muscle Strength   The patient has normal right wrist strength  Other   Sensation: normal  Pulse: present      Left Hand Exam     Muscle Strength   The patient has normal left wrist strength  Other   Sensation: normal  Pulse: present      Right Elbow Exam     Tenderness   The patient is experiencing no tenderness  Range of Motion   The patient has normal right elbow ROM  Muscle Strength   The patient has normal right elbow strength (5/5 strength flexion and extension)  Other   Sensation: normal      Left Elbow Exam     Muscle Strength   Left elbow normal strength: 5/5 strength flexion and extension  Other   Sensation: normal      Right Shoulder Exam     Tenderness   The patient is experiencing tenderness in the biceps tendon (Trapezius, anterior glenohumeral)      Range of Motion   Active abduction: 90   Forward flexion: 90   Internal rotation 0 degrees: Lumbar     Muscle Strength   Abduction: 5/5   Internal rotation: 5/5   External rotation: 4/5   Supraspinatus: 4/5     Tests   Thurman test: positive    Other   Sensation: normal    Comments:  Positive empty can test  Positive Brooks's  Positive axial load and grind      Left Shoulder Exam     Muscle Strength   Abduction: 5/5     Other   Sensation: normal           Strength/Myotome Testing     Left Wrist/Hand   Normal wrist strength    Right Wrist/Hand   Normal wrist strength      Physical Exam  Vitals signs and nursing note reviewed  Constitutional:       General: He is not in acute distress  Appearance: He is well-developed  HENT:      Head: Normocephalic and atraumatic  Eyes:      Conjunctiva/sclera: Conjunctivae normal    Neck:      Trachea: No tracheal deviation  Cardiovascular:      Rate and Rhythm: Normal rate  Pulmonary:      Effort: Pulmonary effort is normal  No respiratory distress  Abdominal:      General: There is no distension  Musculoskeletal:         General: Tenderness present  Skin:     General: Skin is warm and dry  Neurological:      Mental Status: He is alert and oriented to person, place, and time  Psychiatric:         Behavior: Behavior normal          I have personally reviewed pertinent films in PACS and my interpretation is Subacromial and inferior glenohumeral calcifications

## 2020-09-21 ENCOUNTER — TRANSCRIBE ORDERS (OUTPATIENT)
Dept: ADMINISTRATIVE | Facility: HOSPITAL | Age: 55
End: 2020-09-21

## 2020-09-21 DIAGNOSIS — S49.91XA UNSPECIFIED INJURY OF RIGHT SHOULDER AND UPPER ARM, INITIAL ENCOUNTER: Primary | ICD-10-CM

## 2020-09-21 NOTE — TELEPHONE ENCOUNTER
Patient's wife stated she spoke to the pharmacy already and they stated she cannot  either medication without the prior auth  Please advise

## 2020-09-21 NOTE — TELEPHONE ENCOUNTER
Levy Burton,    Please advise patient on prior auth process in which pharmacy will send us forms to fill out, and then forms will be submitted to insurance company for their approval     Thanks

## 2020-09-24 ENCOUNTER — TELEPHONE (OUTPATIENT)
Dept: OBGYN CLINIC | Facility: CLINIC | Age: 55
End: 2020-09-24

## 2020-09-24 ENCOUNTER — HOSPITAL ENCOUNTER (OUTPATIENT)
Dept: RADIOLOGY | Facility: HOSPITAL | Age: 55
Discharge: HOME | End: 2020-09-24
Attending: FAMILY MEDICINE

## 2020-09-24 DIAGNOSIS — Z13.89 ENCOUNTER FOR IMAGING TO SCREEN FOR METAL PRIOR TO MRI: ICD-10-CM

## 2020-09-24 NOTE — TELEPHONE ENCOUNTER
I spoke with patient and she states that [employer] will not allow her to pay out of pocket while Prior Auth goes thru  I did confirm it with the pharmacy  Pharmacy state the Prior Auth has still not gone thru on their end  Patient is wondering if Dr Ernestina Rushing will send a small script of Percocet to [facility] on [address] and he will pay out of pocket for it while we wait for prior authorization    Thanks

## 2020-09-24 NOTE — TELEPHONE ENCOUNTER
Spoke to patient and he is coming in right away to fill out form  He is in a lot of pain  He stated he was here on Saturday(9/19) and was supposed to get a call from Adam Schroeder to schedule Mri and Follow up but its 5 days later and she has not contacted him

## 2020-09-24 NOTE — TELEPHONE ENCOUNTER
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

## 2020-09-24 NOTE — TELEPHONE ENCOUNTER
Dot Schwartz,    Please reach out to [employer] and have them send us prior auth form  [phone]      Thanks

## 2020-09-24 NOTE — TELEPHONE ENCOUNTER
Patient's wife Elo Daily called in very upset about the patient not getting his medication, asking why this is taking 5 days  She said something has to be done or she will look for a new doctor  Chris Steen agreed to speak with her

## 2020-09-25 ENCOUNTER — TELEPHONE (OUTPATIENT)
Dept: OBGYN CLINIC | Facility: CLINIC | Age: 55
End: 2020-09-25

## 2020-09-25 ENCOUNTER — TELEPHONE (OUTPATIENT)
Dept: OBGYN CLINIC | Facility: HOSPITAL | Age: 55
End: 2020-09-25

## 2020-09-25 DIAGNOSIS — S49.91XA INJURY OF GLENOID LABRUM, RIGHT, INITIAL ENCOUNTER: ICD-10-CM

## 2020-09-25 RX ORDER — OXYCODONE HYDROCHLORIDE AND ACETAMINOPHEN 5; 325 MG/1; MG/1
1 TABLET ORAL 2 TIMES DAILY PRN
Qty: 10 TABLET | Refills: 0 | Status: SHIPPED | OUTPATIENT
Start: 2020-09-25 | End: 2020-09-29

## 2020-09-25 NOTE — TELEPHONE ENCOUNTER
Call to [employer] they will not allow the patient to pay privately until they have the denial from his [facility]  Call to Rocio Hartley @ [phone] to let her know  I had called [employer] and that they needed the denial or a call from his [facility] before they would let them pay privately

## 2020-09-25 NOTE — TELEPHONE ENCOUNTER
Dr Haq  from [address] is asking which fax # the auth form was sent to for the Tramadol  I called Jose and petra to [employer], the paperwork wasn't faxed yet  I put [address] Flicker through with [employer] so he can fax new form to her

## 2020-09-25 NOTE — TELEPHONE ENCOUNTER
Patient made aware and will try in a few hours to see if the RX will be allowed at [facility]   He will let us know

## 2020-09-25 NOTE — TELEPHONE ENCOUNTER
Levy Amador,    Please reach out to patient and advise that Rx of percocet sent to [facility] as requested      Thanks

## 2020-09-28 ENCOUNTER — HOSPITAL ENCOUNTER (OUTPATIENT)
Dept: RADIOLOGY | Facility: HOSPITAL | Age: 55
Discharge: HOME | End: 2020-09-28
Attending: FAMILY MEDICINE
Payer: COMMERCIAL

## 2020-09-28 ENCOUNTER — HOSPITAL ENCOUNTER (OUTPATIENT)
Dept: RADIOLOGY | Facility: HOSPITAL | Age: 55
Discharge: HOME | End: 2020-09-28
Attending: FAMILY MEDICINE | Admitting: RADIOLOGY
Payer: COMMERCIAL

## 2020-09-28 DIAGNOSIS — S49.91XA UNSPECIFIED INJURY OF RIGHT SHOULDER AND UPPER ARM, INITIAL ENCOUNTER: ICD-10-CM

## 2020-09-28 DIAGNOSIS — S49.91XA INJURY OF GLENOID LABRUM, RIGHT, INITIAL ENCOUNTER: ICD-10-CM

## 2020-09-28 PROCEDURE — 23350 INJECTION FOR SHOULDER X-RAY: CPT

## 2020-09-28 PROCEDURE — 73222 MRI JOINT UPR EXTREM W/DYE: CPT

## 2020-09-28 PROCEDURE — A9585 GADOBUTROL INJECTION: HCPCS | Performed by: FAMILY MEDICINE

## 2020-09-28 PROCEDURE — G1004 CDSM NDSC: HCPCS

## 2020-09-28 PROCEDURE — 77002 NEEDLE LOCALIZATION BY XRAY: CPT

## 2020-09-28 RX ORDER — SODIUM CHLORIDE 9 MG/ML
50 INJECTION INTRAVENOUS
Status: COMPLETED | OUTPATIENT
Start: 2020-09-28 | End: 2020-09-28

## 2020-09-28 RX ORDER — LIDOCAINE HYDROCHLORIDE 10 MG/ML
10 INJECTION, SOLUTION EPIDURAL; INFILTRATION; INTRACAUDAL; PERINEURAL
Status: COMPLETED | OUTPATIENT
Start: 2020-09-28 | End: 2020-09-28

## 2020-09-28 RX ADMIN — IOHEXOL 4 ML: 300 INJECTION, SOLUTION INTRAVENOUS at 08:29

## 2020-09-28 RX ADMIN — SODIUM CHLORIDE 10 ML: 9 INJECTION, SOLUTION INTRAMUSCULAR; INTRAVENOUS; SUBCUTANEOUS at 08:30

## 2020-09-28 RX ADMIN — LIDOCAINE HYDROCHLORIDE 8 ML: 10 INJECTION, SOLUTION EPIDURAL; INFILTRATION; INTRACAUDAL; PERINEURAL at 08:30

## 2020-09-28 RX ADMIN — GADOBUTROL 2 ML: 604.72 INJECTION INTRAVENOUS at 08:30

## 2020-09-29 ENCOUNTER — OFFICE VISIT (OUTPATIENT)
Dept: OBGYN CLINIC | Facility: CLINIC | Age: 55
End: 2020-09-29
Payer: COMMERCIAL

## 2020-09-29 VITALS
WEIGHT: 137 LBS | BODY MASS INDEX: 19.61 KG/M2 | HEIGHT: 70 IN | SYSTOLIC BLOOD PRESSURE: 121 MMHG | DIASTOLIC BLOOD PRESSURE: 76 MMHG | HEART RATE: 96 BPM

## 2020-09-29 DIAGNOSIS — M67.813 TENDINOSIS OF RIGHT SHOULDER: ICD-10-CM

## 2020-09-29 DIAGNOSIS — S49.91XD INJURY OF GLENOID LABRUM, RIGHT, SUBSEQUENT ENCOUNTER: ICD-10-CM

## 2020-09-29 DIAGNOSIS — M19.011 ARTHRITIS OF RIGHT ACROMIOCLAVICULAR JOINT: ICD-10-CM

## 2020-09-29 DIAGNOSIS — M75.41 SUBACROMIAL IMPINGEMENT OF RIGHT SHOULDER: Primary | ICD-10-CM

## 2020-09-29 PROCEDURE — 20610 DRAIN/INJ JOINT/BURSA W/O US: CPT | Performed by: FAMILY MEDICINE

## 2020-09-29 PROCEDURE — 99213 OFFICE O/P EST LOW 20 MIN: CPT | Performed by: FAMILY MEDICINE

## 2020-09-29 RX ORDER — LIDOCAINE HYDROCHLORIDE 10 MG/ML
3 INJECTION, SOLUTION INFILTRATION; PERINEURAL
Status: COMPLETED | OUTPATIENT
Start: 2020-09-29 | End: 2020-09-29

## 2020-09-29 RX ORDER — OXYCODONE HYDROCHLORIDE AND ACETAMINOPHEN 5; 325 MG/1; MG/1
1 TABLET ORAL 2 TIMES DAILY PRN
Qty: 20 TABLET | Refills: 0 | Status: SHIPPED | OUTPATIENT
Start: 2020-09-29 | End: 2020-11-30

## 2020-09-29 RX ORDER — LIDOCAINE HYDROCHLORIDE 10 MG/ML
4 INJECTION, SOLUTION INFILTRATION; PERINEURAL
Status: COMPLETED | OUTPATIENT
Start: 2020-09-29 | End: 2020-09-29

## 2020-09-29 RX ORDER — TRIAMCINOLONE ACETONIDE 40 MG/ML
40 INJECTION, SUSPENSION INTRA-ARTICULAR; INTRAMUSCULAR
Status: COMPLETED | OUTPATIENT
Start: 2020-09-29 | End: 2020-09-29

## 2020-09-29 RX ADMIN — LIDOCAINE HYDROCHLORIDE 3 ML: 10 INJECTION, SOLUTION INFILTRATION; PERINEURAL at 11:34

## 2020-09-29 RX ADMIN — LIDOCAINE HYDROCHLORIDE 4 ML: 10 INJECTION, SOLUTION INFILTRATION; PERINEURAL at 11:34

## 2020-09-29 RX ADMIN — TRIAMCINOLONE ACETONIDE 40 MG: 40 INJECTION, SUSPENSION INTRA-ARTICULAR; INTRAMUSCULAR at 11:34

## 2020-09-29 NOTE — PROGRESS NOTES
Assessment/Plan:  Assessment/Plan   Diagnoses and all orders for this visit:    Subacromial impingement of right shoulder  -     Large joint arthrocentesis: R subacromial bursa  -     Ambulatory referral to Physical Therapy; Future    Injury of glenoid labrum, right, subsequent encounter  -     Ambulatory referral to Physical Therapy; Future  -     oxyCODONE-acetaminophen (PERCOCET) 5-325 mg per tablet; Take 1 tablet by mouth 2 (two) times a day as needed for moderate painMax Daily Amount: 2 tablets    Arthritis of right acromioclavicular joint  -     Ambulatory referral to Physical Therapy; Future    Tendinosis of right shoulder  -     Ambulatory referral to Physical Therapy; Future    Other orders  -     Cancel: Ambulatory referral to Orthopedic Surgery; Future        23-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 and instability of more than 6 weeks duration  Discussed with patient MRI results, impression and plan  MRI arthrogram of the right shoulder is noted for focal nondisplaced full-thickness tear at the chondral labral junction at the 10 o'clock position, moderate osteoarthritis of the [facility] joint with inferiorly directed osteophyte exerting mass effect on the supraspinatus  Clinical impression is that he may be symptomatic from impingement  I discussed treatment regimen of alternating anti-inflammatory with opiate and Tylenol, corticosteroid injection, and physical therapy to which he agreed  I administered mixture of 4 cc 1% lidocaine and 1 cc Kenalog to the right shoulder subacromial space without complication  He is to start physical therapy as soon as possible and do home exercises as directed  He will follow up in 4 weeks at which point he will be re-evaluated  If no improvement we will refer to orthopedic surgeon  Subjective:   Patient ID: Vivi Mae is a 48 y o  male    Chief Complaint   Patient presents with    Right Shoulder - Follow-up, Pain 71-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 and instability of more than 6 weeks duration following injury  He was last seen by me 10 days ago at which point he was referred for MRI arthrogram of the right shoulder  He denies any improvement in his symptoms since his last visit  He has pain described as generalized to the shoulder worse at the anterior aspect, throbbing and sharp, radiating distally along the anterior lateral aspect of the upper arm, worse with move the arm, associated with numbness and tingling, associated with clicking, and improved with resting and return to neutral position  Since last visit he has been experiencing more sensation of instability in the shoulder  Shoulder Pain   This is a new problem  The current episode started more than 1 month ago  The problem occurs constantly  The problem has been unchanged  Associated symptoms include arthralgias, numbness and weakness  Pertinent negatives include no joint swelling  Exacerbated by: Arm movement  He has tried rest, NSAIDs, oral narcotics, position changes and acetaminophen for the symptoms  The treatment provided mild relief  Review of Systems   Musculoskeletal: Positive for arthralgias  Negative for joint swelling  Neurological: Positive for weakness and numbness  Objective:  Vitals:    09/29/20 1120   BP: 121/76   Pulse: 96   Weight: 62 1 kg (137 lb)   Height: 5' 10" (1 778 m)     Ortho Exam    Physical Exam  Vitals signs and nursing note reviewed  Constitutional:       General: He is not in acute distress  Appearance: He is well-developed  HENT:      Head: Normocephalic and atraumatic  Eyes:      Conjunctiva/sclera: Conjunctivae normal    Neck:      Trachea: No tracheal deviation  Cardiovascular:      Rate and Rhythm: Normal rate  Pulmonary:      Effort: Pulmonary effort is normal  No respiratory distress     Abdominal:      General: There is no distension  Skin:     General: Skin is warm and dry  Neurological:      Mental Status: He is alert and oriented to person, place, and time  Psychiatric:         Behavior: Behavior normal          I have personally reviewed pertinent films in PACS and my interpretation is AC joint arthropathy        Large joint arthrocentesis: R subacromial bursa  Date/Time: 9/29/2020 11:34 AM  Consent given by: patient  Site marked: site marked  Timeout: Immediately prior to procedure a time out was called to verify the correct patient, procedure, equipment, support staff and site/side marked as required   Supporting Documentation  Indications: pain   Procedure Details  Location: shoulder - R subacromial bursa  Preparation: Patient was prepped and draped in the usual sterile fashion  Needle size: 22 G  Ultrasound guidance: no  Approach: anterolateral  Medications administered: 3 mL lidocaine 1 %; 4 mL lidocaine 1 %; 40 mg triamcinolone acetonide 40 mg/mL    Patient tolerance: patient tolerated the procedure well with no immediate complications  Dressing:  Sterile dressing applied

## 2020-10-15 ENCOUNTER — EVALUATION (OUTPATIENT)
Dept: PHYSICAL THERAPY | Facility: CLINIC | Age: 55
End: 2020-10-15
Payer: COMMERCIAL

## 2020-10-15 DIAGNOSIS — M75.41 SUBACROMIAL IMPINGEMENT OF RIGHT SHOULDER: Primary | ICD-10-CM

## 2020-10-15 DIAGNOSIS — M67.813 TENDINOSIS OF RIGHT SHOULDER: ICD-10-CM

## 2020-10-15 DIAGNOSIS — M19.011 ARTHRITIS OF RIGHT ACROMIOCLAVICULAR JOINT: ICD-10-CM

## 2020-10-15 DIAGNOSIS — S49.91XD INJURY OF GLENOID LABRUM, RIGHT, SUBSEQUENT ENCOUNTER: ICD-10-CM

## 2020-10-15 PROCEDURE — 97162 PT EVAL MOD COMPLEX 30 MIN: CPT | Performed by: PHYSICAL THERAPIST

## 2020-10-15 PROCEDURE — 97110 THERAPEUTIC EXERCISES: CPT | Performed by: PHYSICAL THERAPIST

## 2020-10-20 ENCOUNTER — OFFICE VISIT (OUTPATIENT)
Dept: PHYSICAL THERAPY | Facility: CLINIC | Age: 55
End: 2020-10-20
Payer: COMMERCIAL

## 2020-10-20 DIAGNOSIS — M19.011 ARTHRITIS OF RIGHT ACROMIOCLAVICULAR JOINT: ICD-10-CM

## 2020-10-20 DIAGNOSIS — M67.813 TENDINOSIS OF RIGHT SHOULDER: ICD-10-CM

## 2020-10-20 DIAGNOSIS — S49.91XD INJURY OF GLENOID LABRUM, RIGHT, SUBSEQUENT ENCOUNTER: ICD-10-CM

## 2020-10-20 DIAGNOSIS — M75.41 SUBACROMIAL IMPINGEMENT OF RIGHT SHOULDER: Primary | ICD-10-CM

## 2020-10-20 PROCEDURE — 97110 THERAPEUTIC EXERCISES: CPT

## 2020-10-20 PROCEDURE — 97112 NEUROMUSCULAR REEDUCATION: CPT

## 2020-10-22 ENCOUNTER — OFFICE VISIT (OUTPATIENT)
Dept: PHYSICAL THERAPY | Facility: CLINIC | Age: 55
End: 2020-10-22
Payer: COMMERCIAL

## 2020-10-22 DIAGNOSIS — M75.41 SUBACROMIAL IMPINGEMENT OF RIGHT SHOULDER: Primary | ICD-10-CM

## 2020-10-22 DIAGNOSIS — S49.91XD INJURY OF GLENOID LABRUM, RIGHT, SUBSEQUENT ENCOUNTER: ICD-10-CM

## 2020-10-22 DIAGNOSIS — M67.813 TENDINOSIS OF RIGHT SHOULDER: ICD-10-CM

## 2020-10-22 DIAGNOSIS — M19.011 ARTHRITIS OF RIGHT ACROMIOCLAVICULAR JOINT: ICD-10-CM

## 2020-10-22 PROCEDURE — 97110 THERAPEUTIC EXERCISES: CPT

## 2020-10-22 PROCEDURE — 97112 NEUROMUSCULAR REEDUCATION: CPT

## 2020-10-26 ENCOUNTER — APPOINTMENT (OUTPATIENT)
Dept: PHYSICAL THERAPY | Facility: CLINIC | Age: 55
End: 2020-10-26
Payer: COMMERCIAL

## 2020-10-28 ENCOUNTER — OFFICE VISIT (OUTPATIENT)
Dept: FAMILY MEDICINE CLINIC | Facility: CLINIC | Age: 55
End: 2020-10-28
Payer: COMMERCIAL

## 2020-10-28 VITALS
DIASTOLIC BLOOD PRESSURE: 52 MMHG | BODY MASS INDEX: 19.56 KG/M2 | HEIGHT: 70 IN | TEMPERATURE: 97.9 F | WEIGHT: 136.6 LBS | HEART RATE: 90 BPM | OXYGEN SATURATION: 100 % | SYSTOLIC BLOOD PRESSURE: 112 MMHG

## 2020-10-28 DIAGNOSIS — Z72.0 TOBACCO ABUSE: ICD-10-CM

## 2020-10-28 DIAGNOSIS — Z00.00 ANNUAL PHYSICAL EXAM: ICD-10-CM

## 2020-10-28 DIAGNOSIS — M25.50 ARTHRALGIA, UNSPECIFIED JOINT: Primary | ICD-10-CM

## 2020-10-28 DIAGNOSIS — Z12.11 SCREENING FOR COLON CANCER: ICD-10-CM

## 2020-10-28 DIAGNOSIS — Z23 ENCOUNTER FOR IMMUNIZATION: ICD-10-CM

## 2020-10-28 PROCEDURE — 99386 PREV VISIT NEW AGE 40-64: CPT | Performed by: STUDENT IN AN ORGANIZED HEALTH CARE EDUCATION/TRAINING PROGRAM

## 2020-10-28 PROCEDURE — 3725F SCREEN DEPRESSION PERFORMED: CPT | Performed by: STUDENT IN AN ORGANIZED HEALTH CARE EDUCATION/TRAINING PROGRAM

## 2020-10-28 PROCEDURE — 90682 RIV4 VACC RECOMBINANT DNA IM: CPT

## 2020-10-28 PROCEDURE — 90471 IMMUNIZATION ADMIN: CPT

## 2020-10-29 ENCOUNTER — OFFICE VISIT (OUTPATIENT)
Dept: PHYSICAL THERAPY | Facility: CLINIC | Age: 55
End: 2020-10-29
Payer: COMMERCIAL

## 2020-10-29 DIAGNOSIS — M75.41 SUBACROMIAL IMPINGEMENT OF RIGHT SHOULDER: Primary | ICD-10-CM

## 2020-10-29 DIAGNOSIS — M19.011 ARTHRITIS OF RIGHT ACROMIOCLAVICULAR JOINT: ICD-10-CM

## 2020-10-29 DIAGNOSIS — S49.91XD INJURY OF GLENOID LABRUM, RIGHT, SUBSEQUENT ENCOUNTER: ICD-10-CM

## 2020-10-29 DIAGNOSIS — M67.813 TENDINOSIS OF RIGHT SHOULDER: ICD-10-CM

## 2020-10-29 PROCEDURE — 97110 THERAPEUTIC EXERCISES: CPT

## 2020-10-29 PROCEDURE — 97112 NEUROMUSCULAR REEDUCATION: CPT

## 2020-11-01 ENCOUNTER — TELEPHONE (OUTPATIENT)
Dept: OTHER | Facility: OTHER | Age: 55
End: 2020-11-01

## 2020-11-12 ENCOUNTER — LAB (OUTPATIENT)
Dept: LAB | Facility: CLINIC | Age: 55
End: 2020-11-12
Payer: COMMERCIAL

## 2020-11-12 ENCOUNTER — APPOINTMENT (OUTPATIENT)
Dept: RADIOLOGY | Facility: CLINIC | Age: 55
End: 2020-11-12
Payer: COMMERCIAL

## 2020-11-12 DIAGNOSIS — M25.50 ARTHRALGIA, UNSPECIFIED JOINT: ICD-10-CM

## 2020-11-12 LAB
ANION GAP SERPL CALCULATED.3IONS-SCNC: 3 MMOL/L (ref 4–13)
BUN SERPL-MCNC: 13 MG/DL (ref 5–25)
CALCIUM SERPL-MCNC: 10.2 MG/DL (ref 8.3–10.1)
CHLORIDE SERPL-SCNC: 107 MMOL/L (ref 100–108)
CK SERPL-CCNC: 57 U/L (ref 39–308)
CO2 SERPL-SCNC: 31 MMOL/L (ref 21–32)
CREAT SERPL-MCNC: 0.81 MG/DL (ref 0.6–1.3)
CRYOGLOB RF SER-ACNC: ABNORMAL [IU]/ML
ERYTHROCYTE [SEDIMENTATION RATE] IN BLOOD: 121 MM/HOUR (ref 0–19)
GFR SERPL CREATININE-BSD FRML MDRD: 101 ML/MIN/1.73SQ M
GLUCOSE P FAST SERPL-MCNC: 120 MG/DL (ref 65–99)
POTASSIUM SERPL-SCNC: 4.2 MMOL/L (ref 3.5–5.3)
RHEUMATOID FACT SER QL LA: POSITIVE
SODIUM SERPL-SCNC: 141 MMOL/L (ref 136–145)
TSH SERPL DL<=0.05 MIU/L-ACNC: 0.67 UIU/ML (ref 0.36–3.74)
URATE SERPL-MCNC: 5.6 MG/DL (ref 4.2–8)

## 2020-11-12 PROCEDURE — 86431 RHEUMATOID FACTOR QUANT: CPT

## 2020-11-12 PROCEDURE — 84550 ASSAY OF BLOOD/URIC ACID: CPT

## 2020-11-12 PROCEDURE — 73562 X-RAY EXAM OF KNEE 3: CPT

## 2020-11-12 PROCEDURE — 86618 LYME DISEASE ANTIBODY: CPT

## 2020-11-12 PROCEDURE — 73502 X-RAY EXAM HIP UNI 2-3 VIEWS: CPT

## 2020-11-12 PROCEDURE — 80048 BASIC METABOLIC PNL TOTAL CA: CPT

## 2020-11-12 PROCEDURE — 86225 DNA ANTIBODY NATIVE: CPT

## 2020-11-12 PROCEDURE — 86430 RHEUMATOID FACTOR TEST QUAL: CPT

## 2020-11-12 PROCEDURE — 84443 ASSAY THYROID STIM HORMONE: CPT

## 2020-11-12 PROCEDURE — 85652 RBC SED RATE AUTOMATED: CPT

## 2020-11-12 PROCEDURE — 72110 X-RAY EXAM L-2 SPINE 4/>VWS: CPT

## 2020-11-12 PROCEDURE — 86038 ANTINUCLEAR ANTIBODIES: CPT

## 2020-11-12 PROCEDURE — 36415 COLL VENOUS BLD VENIPUNCTURE: CPT

## 2020-11-12 PROCEDURE — 82550 ASSAY OF CK (CPK): CPT

## 2020-11-13 LAB
B BURGDOR IGG+IGM SER-ACNC: 18
DSDNA AB SER-ACNC: 1 IU/ML (ref 0–9)
RYE IGE QN: NEGATIVE

## 2020-11-17 ENCOUNTER — HOSPITAL ENCOUNTER (EMERGENCY)
Facility: HOSPITAL | Age: 55
Discharge: HOME | End: 2020-11-17
Attending: EMERGENCY MEDICINE | Admitting: EMERGENCY MEDICINE
Payer: COMMERCIAL

## 2020-11-17 ENCOUNTER — APPOINTMENT (EMERGENCY)
Dept: RADIOLOGY | Facility: HOSPITAL | Age: 55
End: 2020-11-17
Payer: COMMERCIAL

## 2020-11-17 ENCOUNTER — TELEPHONE (OUTPATIENT)
Dept: FAMILY MEDICINE CLINIC | Facility: CLINIC | Age: 55
End: 2020-11-17

## 2020-11-17 VITALS
RESPIRATION RATE: 18 BRPM | HEART RATE: 104 BPM | DIASTOLIC BLOOD PRESSURE: 72 MMHG | SYSTOLIC BLOOD PRESSURE: 125 MMHG | HEIGHT: 70 IN | OXYGEN SATURATION: 99 % | TEMPERATURE: 98.7 F | BODY MASS INDEX: 19.6 KG/M2

## 2020-11-17 DIAGNOSIS — M25.519 SHOULDER PAIN: Primary | ICD-10-CM

## 2020-11-17 PROCEDURE — 99284 EMERGENCY DEPT VISIT MOD MDM: CPT | Performed by: EMERGENCY MEDICINE

## 2020-11-17 PROCEDURE — 99283 EMERGENCY DEPT VISIT LOW MDM: CPT

## 2020-11-17 PROCEDURE — 73030 X-RAY EXAM OF SHOULDER: CPT

## 2020-11-17 RX ORDER — CYCLOBENZAPRINE HCL 10 MG
10 TABLET ORAL 2 TIMES DAILY PRN
Qty: 20 TABLET | Refills: 0 | Status: SHIPPED | OUTPATIENT
Start: 2020-11-17 | End: 2020-11-30

## 2020-11-17 RX ORDER — CYCLOBENZAPRINE HCL 10 MG
10 TABLET ORAL ONCE
Status: COMPLETED | OUTPATIENT
Start: 2020-11-17 | End: 2020-11-17

## 2020-11-17 RX ORDER — PREDNISONE 20 MG/1
60 TABLET ORAL DAILY
Qty: 15 TABLET | Refills: 0 | Status: SHIPPED | OUTPATIENT
Start: 2020-11-17 | End: 2020-11-22

## 2020-11-17 RX ORDER — PREDNISONE 20 MG/1
60 TABLET ORAL ONCE
Status: COMPLETED | OUTPATIENT
Start: 2020-11-17 | End: 2020-11-17

## 2020-11-17 RX ADMIN — CYCLOBENZAPRINE HYDROCHLORIDE 10 MG: 10 TABLET, FILM COATED ORAL at 11:51

## 2020-11-17 RX ADMIN — PREDNISONE 60 MG: 20 TABLET ORAL at 11:51

## 2020-11-18 ENCOUNTER — DOCUMENTATION (OUTPATIENT)
Dept: FAMILY MEDICINE CLINIC | Facility: CLINIC | Age: 55
End: 2020-11-18

## 2020-11-18 ENCOUNTER — TELEPHONE (OUTPATIENT)
Dept: FAMILY MEDICINE CLINIC | Facility: CLINIC | Age: 55
End: 2020-11-18

## 2020-11-18 DIAGNOSIS — Z13.6 ENCOUNTER FOR LIPID SCREENING FOR CARDIOVASCULAR DISEASE: Primary | ICD-10-CM

## 2020-11-18 DIAGNOSIS — Z13.220 ENCOUNTER FOR LIPID SCREENING FOR CARDIOVASCULAR DISEASE: Primary | ICD-10-CM

## 2020-11-18 DIAGNOSIS — M19.90 ARTHRITIS: ICD-10-CM

## 2020-11-20 ENCOUNTER — OFFICE VISIT (OUTPATIENT)
Dept: OBGYN CLINIC | Facility: CLINIC | Age: 55
End: 2020-11-20
Payer: COMMERCIAL

## 2020-11-20 VITALS
BODY MASS INDEX: 19.04 KG/M2 | HEART RATE: 108 BPM | DIASTOLIC BLOOD PRESSURE: 82 MMHG | SYSTOLIC BLOOD PRESSURE: 120 MMHG | WEIGHT: 133 LBS | HEIGHT: 70 IN

## 2020-11-20 DIAGNOSIS — M19.011 ARTHRITIS OF RIGHT ACROMIOCLAVICULAR JOINT: ICD-10-CM

## 2020-11-20 DIAGNOSIS — M75.41 SUBACROMIAL IMPINGEMENT OF RIGHT SHOULDER: Primary | ICD-10-CM

## 2020-11-20 DIAGNOSIS — M62.838 TRAPEZIUS MUSCLE SPASM: ICD-10-CM

## 2020-11-20 PROCEDURE — 4004F PT TOBACCO SCREEN RCVD TLK: CPT | Performed by: FAMILY MEDICINE

## 2020-11-20 PROCEDURE — 99213 OFFICE O/P EST LOW 20 MIN: CPT | Performed by: FAMILY MEDICINE

## 2020-11-20 RX ORDER — TIZANIDINE 4 MG/1
4 TABLET ORAL 3 TIMES DAILY PRN
Qty: 90 TABLET | Refills: 1 | Status: SHIPPED | OUTPATIENT
Start: 2020-11-20 | End: 2020-11-30

## 2020-11-30 ENCOUNTER — OFFICE VISIT (OUTPATIENT)
Dept: OBGYN CLINIC | Facility: CLINIC | Age: 55
End: 2020-11-30
Payer: COMMERCIAL

## 2020-11-30 VITALS
SYSTOLIC BLOOD PRESSURE: 106 MMHG | DIASTOLIC BLOOD PRESSURE: 74 MMHG | BODY MASS INDEX: 18.84 KG/M2 | HEIGHT: 70 IN | WEIGHT: 131.6 LBS | HEART RATE: 101 BPM | RESPIRATION RATE: 18 BRPM

## 2020-11-30 DIAGNOSIS — Z01.818 ENCOUNTER FOR PREADMISSION TESTING: Primary | ICD-10-CM

## 2020-11-30 DIAGNOSIS — M75.41 SUBACROMIAL IMPINGEMENT OF RIGHT SHOULDER: ICD-10-CM

## 2020-11-30 DIAGNOSIS — M25.511 ACUTE PAIN OF RIGHT SHOULDER: ICD-10-CM

## 2020-11-30 DIAGNOSIS — M75.21 BICEPS TENDINITIS OF RIGHT SHOULDER: ICD-10-CM

## 2020-11-30 DIAGNOSIS — M75.41 SUBACROMIAL IMPINGEMENT OF RIGHT SHOULDER: Primary | ICD-10-CM

## 2020-11-30 DIAGNOSIS — M19.011 ARTHRITIS OF RIGHT ACROMIOCLAVICULAR JOINT: ICD-10-CM

## 2020-11-30 DIAGNOSIS — M75.81 ROTATOR CUFF TENDINITIS, RIGHT: ICD-10-CM

## 2020-11-30 PROCEDURE — 99213 OFFICE O/P EST LOW 20 MIN: CPT | Performed by: ORTHOPAEDIC SURGERY

## 2020-11-30 PROCEDURE — 3008F BODY MASS INDEX DOCD: CPT | Performed by: ORTHOPAEDIC SURGERY

## 2020-11-30 PROCEDURE — 4004F PT TOBACCO SCREEN RCVD TLK: CPT | Performed by: ORTHOPAEDIC SURGERY

## 2020-11-30 RX ORDER — TRAMADOL HYDROCHLORIDE 50 MG/1
50 TABLET ORAL 2 TIMES DAILY PRN
Qty: 20 TABLET | Refills: 0 | Status: SHIPPED | OUTPATIENT
Start: 2020-11-30 | End: 2020-12-04 | Stop reason: HOSPADM

## 2020-11-30 RX ORDER — CHLORHEXIDINE GLUCONATE 4 G/100ML
SOLUTION TOPICAL DAILY PRN
Status: CANCELLED | OUTPATIENT
Start: 2020-12-04

## 2020-11-30 RX ORDER — CEFAZOLIN SODIUM 1 G/50ML
1000 SOLUTION INTRAVENOUS ONCE
Status: CANCELLED | OUTPATIENT
Start: 2020-12-04 | End: 2020-11-30

## 2020-12-01 ENCOUNTER — ANESTHESIA EVENT (OUTPATIENT)
Dept: PERIOP | Facility: HOSPITAL | Age: 55
End: 2020-12-01
Payer: COMMERCIAL

## 2020-12-01 ENCOUNTER — LAB (OUTPATIENT)
Dept: LAB | Facility: HOSPITAL | Age: 55
End: 2020-12-01
Attending: ORTHOPAEDIC SURGERY
Payer: COMMERCIAL

## 2020-12-01 ENCOUNTER — HOSPITAL ENCOUNTER (OUTPATIENT)
Dept: RADIOLOGY | Facility: HOSPITAL | Age: 55
Discharge: HOME | End: 2020-12-01
Attending: ORTHOPAEDIC SURGERY
Payer: COMMERCIAL

## 2020-12-01 DIAGNOSIS — Z01.818 ENCOUNTER FOR PREADMISSION TESTING: ICD-10-CM

## 2020-12-01 DIAGNOSIS — M75.41 SUBACROMIAL IMPINGEMENT OF RIGHT SHOULDER: ICD-10-CM

## 2020-12-01 LAB
ATRIAL RATE: 81 BPM
BASOPHILS # BLD AUTO: 0.02 THOUSANDS/ΜL (ref 0–0.1)
BASOPHILS NFR BLD AUTO: 0 % (ref 0–1)
EOSINOPHIL # BLD AUTO: 0.11 THOUSAND/ΜL (ref 0–0.61)
EOSINOPHIL NFR BLD AUTO: 1 % (ref 0–6)
ERYTHROCYTE [DISTWIDTH] IN BLOOD BY AUTOMATED COUNT: 14.1 % (ref 11.6–15.1)
HCT VFR BLD AUTO: 44.1 % (ref 36.5–49.3)
HGB BLD-MCNC: 14.1 G/DL (ref 12–17)
IMM GRANULOCYTES # BLD AUTO: 0.03 THOUSAND/UL (ref 0–0.2)
IMM GRANULOCYTES NFR BLD AUTO: 0 % (ref 0–2)
LYMPHOCYTES # BLD AUTO: 3.18 THOUSANDS/ΜL (ref 0.6–4.47)
LYMPHOCYTES NFR BLD AUTO: 27 % (ref 14–44)
MCH RBC QN AUTO: 30.3 PG (ref 26.8–34.3)
MCHC RBC AUTO-ENTMCNC: 32 G/DL (ref 31.4–37.4)
MCV RBC AUTO: 95 FL (ref 82–98)
MONOCYTES # BLD AUTO: 0.76 THOUSAND/ΜL (ref 0.17–1.22)
MONOCYTES NFR BLD AUTO: 6 % (ref 4–12)
NEUTROPHILS # BLD AUTO: 7.78 THOUSANDS/ΜL (ref 1.85–7.62)
NEUTS SEG NFR BLD AUTO: 66 % (ref 43–75)
NRBC BLD AUTO-RTO: 0 /100 WBCS
P AXIS: 62 DEGREES
PLATELET # BLD AUTO: 305 THOUSANDS/UL (ref 149–390)
PMV BLD AUTO: 10.3 FL (ref 8.9–12.7)
PR INTERVAL: 124 MS
QRS AXIS: 74 DEGREES
QRSD INTERVAL: 90 MS
QT INTERVAL: 356 MS
QTC INTERVAL: 413 MS
RBC # BLD AUTO: 4.66 MILLION/UL (ref 3.88–5.62)
T WAVE AXIS: 57 DEGREES
VENTRICULAR RATE: 81 BPM
WBC # BLD AUTO: 11.88 THOUSAND/UL (ref 4.31–10.16)

## 2020-12-01 PROCEDURE — 93010 ELECTROCARDIOGRAM REPORT: CPT | Performed by: INTERNAL MEDICINE

## 2020-12-01 PROCEDURE — 85025 COMPLETE CBC W/AUTO DIFF WBC: CPT

## 2020-12-01 PROCEDURE — 93005 ELECTROCARDIOGRAM TRACING: CPT

## 2020-12-01 PROCEDURE — 71046 X-RAY EXAM CHEST 2 VIEWS: CPT

## 2020-12-01 PROCEDURE — 36415 COLL VENOUS BLD VENIPUNCTURE: CPT

## 2020-12-03 DIAGNOSIS — M75.81 ROTATOR CUFF TENDINITIS, RIGHT: Primary | ICD-10-CM

## 2020-12-04 ENCOUNTER — ANESTHESIA (OUTPATIENT)
Dept: PERIOP | Facility: HOSPITAL | Age: 55
End: 2020-12-04
Payer: COMMERCIAL

## 2020-12-04 ENCOUNTER — HOSPITAL ENCOUNTER (OUTPATIENT)
Facility: HOSPITAL | Age: 55
Setting detail: OUTPATIENT SURGERY
Discharge: HOME | End: 2020-12-04
Attending: ORTHOPAEDIC SURGERY | Admitting: ORTHOPAEDIC SURGERY
Payer: COMMERCIAL

## 2020-12-04 VITALS
HEIGHT: 70 IN | BODY MASS INDEX: 18.4 KG/M2 | HEART RATE: 68 BPM | OXYGEN SATURATION: 94 % | SYSTOLIC BLOOD PRESSURE: 103 MMHG | WEIGHT: 128.53 LBS | RESPIRATION RATE: 13 BRPM | TEMPERATURE: 98.1 F | DIASTOLIC BLOOD PRESSURE: 67 MMHG

## 2020-12-04 VITALS — HEART RATE: 77 BPM

## 2020-12-04 DIAGNOSIS — M75.41 IMPINGEMENT SYNDROME OF RIGHT SHOULDER: Primary | ICD-10-CM

## 2020-12-04 DIAGNOSIS — M75.21 BICEPS TENDINITIS ON RIGHT: Chronic | ICD-10-CM

## 2020-12-04 PROCEDURE — C9290 INJ, BUPIVACAINE LIPOSOME: HCPCS | Performed by: ANESTHESIOLOGY

## 2020-12-04 PROCEDURE — 29823 SHO ARTHRS SRG XTNSV DBRDMT: CPT | Performed by: PHYSICIAN ASSISTANT

## 2020-12-04 PROCEDURE — 29826 SHO ARTHRS SRG DECOMPRESSION: CPT | Performed by: ORTHOPAEDIC SURGERY

## 2020-12-04 PROCEDURE — 29823 SHO ARTHRS SRG XTNSV DBRDMT: CPT | Performed by: ORTHOPAEDIC SURGERY

## 2020-12-04 PROCEDURE — 23430 REPAIR BICEPS TENDON: CPT | Performed by: ORTHOPAEDIC SURGERY

## 2020-12-04 PROCEDURE — 29824 SHO ARTHRS SRG DSTL CLAVICLC: CPT | Performed by: PHYSICIAN ASSISTANT

## 2020-12-04 PROCEDURE — 29826 SHO ARTHRS SRG DECOMPRESSION: CPT | Performed by: PHYSICIAN ASSISTANT

## 2020-12-04 PROCEDURE — 29824 SHO ARTHRS SRG DSTL CLAVICLC: CPT | Performed by: ORTHOPAEDIC SURGERY

## 2020-12-04 PROCEDURE — 23430 REPAIR BICEPS TENDON: CPT | Performed by: PHYSICIAN ASSISTANT

## 2020-12-04 DEVICE — PROXIMAL TENODESIS IMPLANT SYSTEM REV: 0
Type: IMPLANTABLE DEVICE | Site: SHOULDER | Status: FUNCTIONAL
Brand: ARTHREX®

## 2020-12-04 RX ORDER — PROPOFOL 10 MG/ML
INJECTION, EMULSION INTRAVENOUS AS NEEDED
Status: DISCONTINUED | OUTPATIENT
Start: 2020-12-04 | End: 2020-12-04

## 2020-12-04 RX ORDER — FENTANYL CITRATE 50 UG/ML
INJECTION, SOLUTION INTRAMUSCULAR; INTRAVENOUS AS NEEDED
Status: DISCONTINUED | OUTPATIENT
Start: 2020-12-04 | End: 2020-12-04

## 2020-12-04 RX ORDER — SODIUM CHLORIDE, SODIUM LACTATE, POTASSIUM CHLORIDE, CALCIUM CHLORIDE 600; 310; 30; 20 MG/100ML; MG/100ML; MG/100ML; MG/100ML
50 INJECTION, SOLUTION INTRAVENOUS CONTINUOUS
Status: DISCONTINUED | OUTPATIENT
Start: 2020-12-04 | End: 2020-12-04 | Stop reason: HOSPADM

## 2020-12-04 RX ORDER — METOCLOPRAMIDE HYDROCHLORIDE 5 MG/ML
10 INJECTION INTRAMUSCULAR; INTRAVENOUS ONCE AS NEEDED
Status: DISCONTINUED | OUTPATIENT
Start: 2020-12-04 | End: 2020-12-04 | Stop reason: HOSPADM

## 2020-12-04 RX ORDER — MIDAZOLAM HYDROCHLORIDE 2 MG/2ML
INJECTION, SOLUTION INTRAMUSCULAR; INTRAVENOUS AS NEEDED
Status: DISCONTINUED | OUTPATIENT
Start: 2020-12-04 | End: 2020-12-04

## 2020-12-04 RX ORDER — ROCURONIUM BROMIDE 10 MG/ML
INJECTION, SOLUTION INTRAVENOUS AS NEEDED
Status: DISCONTINUED | OUTPATIENT
Start: 2020-12-04 | End: 2020-12-04

## 2020-12-04 RX ORDER — ONDANSETRON 2 MG/ML
4 INJECTION INTRAMUSCULAR; INTRAVENOUS ONCE
Status: DISCONTINUED | OUTPATIENT
Start: 2020-12-04 | End: 2020-12-04 | Stop reason: HOSPADM

## 2020-12-04 RX ORDER — NEOSTIGMINE METHYLSULFATE 1 MG/ML
INJECTION INTRAVENOUS AS NEEDED
Status: DISCONTINUED | OUTPATIENT
Start: 2020-12-04 | End: 2020-12-04

## 2020-12-04 RX ORDER — SUCCINYLCHOLINE/SOD CL,ISO/PF 100 MG/5ML
SYRINGE (ML) INTRAVENOUS AS NEEDED
Status: DISCONTINUED | OUTPATIENT
Start: 2020-12-04 | End: 2020-12-04

## 2020-12-04 RX ORDER — GLYCOPYRROLATE 0.2 MG/ML
INJECTION INTRAMUSCULAR; INTRAVENOUS AS NEEDED
Status: DISCONTINUED | OUTPATIENT
Start: 2020-12-04 | End: 2020-12-04

## 2020-12-04 RX ORDER — MAGNESIUM HYDROXIDE 1200 MG/15ML
LIQUID ORAL AS NEEDED
Status: DISCONTINUED | OUTPATIENT
Start: 2020-12-04 | End: 2020-12-04 | Stop reason: HOSPADM

## 2020-12-04 RX ORDER — DEXAMETHASONE SODIUM PHOSPHATE 10 MG/ML
INJECTION, SOLUTION INTRAMUSCULAR; INTRAVENOUS AS NEEDED
Status: DISCONTINUED | OUTPATIENT
Start: 2020-12-04 | End: 2020-12-04

## 2020-12-04 RX ORDER — FENTANYL CITRATE/PF 50 MCG/ML
50 SYRINGE (ML) INJECTION
Status: DISCONTINUED | OUTPATIENT
Start: 2020-12-04 | End: 2020-12-04 | Stop reason: HOSPADM

## 2020-12-04 RX ORDER — SODIUM CHLORIDE, SODIUM LACTATE, POTASSIUM CHLORIDE, CALCIUM CHLORIDE 600; 310; 30; 20 MG/100ML; MG/100ML; MG/100ML; MG/100ML
INJECTION, SOLUTION INTRAVENOUS CONTINUOUS PRN
Status: DISCONTINUED | OUTPATIENT
Start: 2020-12-04 | End: 2020-12-04

## 2020-12-04 RX ORDER — ONDANSETRON 2 MG/ML
INJECTION INTRAMUSCULAR; INTRAVENOUS AS NEEDED
Status: DISCONTINUED | OUTPATIENT
Start: 2020-12-04 | End: 2020-12-04

## 2020-12-04 RX ORDER — ONDANSETRON 2 MG/ML
4 INJECTION INTRAMUSCULAR; INTRAVENOUS ONCE AS NEEDED
Status: DISCONTINUED | OUTPATIENT
Start: 2020-12-04 | End: 2020-12-04 | Stop reason: HOSPADM

## 2020-12-04 RX ORDER — CEFAZOLIN SODIUM 1 G/3ML
INJECTION, POWDER, FOR SOLUTION INTRAMUSCULAR; INTRAVENOUS AS NEEDED
Status: DISCONTINUED | OUTPATIENT
Start: 2020-12-04 | End: 2020-12-04

## 2020-12-04 RX ORDER — PROMETHAZINE HYDROCHLORIDE 25 MG/ML
12.5 INJECTION, SOLUTION INTRAMUSCULAR; INTRAVENOUS ONCE AS NEEDED
Status: DISCONTINUED | OUTPATIENT
Start: 2020-12-04 | End: 2020-12-04 | Stop reason: HOSPADM

## 2020-12-04 RX ORDER — CHLORHEXIDINE GLUCONATE 4 G/100ML
SOLUTION TOPICAL DAILY PRN
Status: DISCONTINUED | OUTPATIENT
Start: 2020-12-04 | End: 2020-12-04 | Stop reason: HOSPADM

## 2020-12-04 RX ORDER — OXYCODONE HYDROCHLORIDE AND ACETAMINOPHEN 5; 325 MG/1; MG/1
1 TABLET ORAL EVERY 4 HOURS PRN
Qty: 20 TABLET | Refills: 0 | Status: SHIPPED | OUTPATIENT
Start: 2020-12-04 | End: 2021-01-29

## 2020-12-04 RX ORDER — ALBUTEROL SULFATE 2.5 MG/3ML
2.5 SOLUTION RESPIRATORY (INHALATION) ONCE AS NEEDED
Status: DISCONTINUED | OUTPATIENT
Start: 2020-12-04 | End: 2020-12-04 | Stop reason: HOSPADM

## 2020-12-04 RX ORDER — OXYCODONE HYDROCHLORIDE AND ACETAMINOPHEN 5; 325 MG/1; MG/1
1 TABLET ORAL ONCE
Status: COMPLETED | OUTPATIENT
Start: 2020-12-04 | End: 2020-12-04

## 2020-12-04 RX ORDER — HYDROMORPHONE HCL/PF 1 MG/ML
0.4 SYRINGE (ML) INJECTION
Status: DISCONTINUED | OUTPATIENT
Start: 2020-12-04 | End: 2020-12-04 | Stop reason: HOSPADM

## 2020-12-04 RX ORDER — CEFAZOLIN SODIUM 1 G/50ML
1000 SOLUTION INTRAVENOUS ONCE
Status: COMPLETED | OUTPATIENT
Start: 2020-12-04 | End: 2020-12-04

## 2020-12-04 RX ORDER — LIDOCAINE HYDROCHLORIDE 10 MG/ML
INJECTION, SOLUTION EPIDURAL; INFILTRATION; INTRACAUDAL; PERINEURAL AS NEEDED
Status: DISCONTINUED | OUTPATIENT
Start: 2020-12-04 | End: 2020-12-04

## 2020-12-04 RX ADMIN — DEXAMETHASONE SODIUM PHOSPHATE 8 MG: 10 INJECTION, SOLUTION INTRAMUSCULAR; INTRAVENOUS at 13:05

## 2020-12-04 RX ADMIN — Medication 100 MG: at 12:56

## 2020-12-04 RX ADMIN — PHENYLEPHRINE HYDROCHLORIDE 40 MCG/MIN: 10 INJECTION INTRAVENOUS at 13:04

## 2020-12-04 RX ADMIN — FENTANYL CITRATE 25 MCG: 50 INJECTION, SOLUTION INTRAMUSCULAR; INTRAVENOUS at 14:33

## 2020-12-04 RX ADMIN — GLYCOPYRROLATE 0.2 MG: 0.2 INJECTION, SOLUTION INTRAMUSCULAR; INTRAVENOUS at 14:52

## 2020-12-04 RX ADMIN — FENTANYL CITRATE 100 MCG: 50 INJECTION, SOLUTION INTRAMUSCULAR; INTRAVENOUS at 12:56

## 2020-12-04 RX ADMIN — ONDANSETRON 4 MG: 2 INJECTION INTRAMUSCULAR; INTRAVENOUS at 13:05

## 2020-12-04 RX ADMIN — ROCURONIUM BROMIDE 50 MG: 10 SOLUTION INTRAVENOUS at 13:05

## 2020-12-04 RX ADMIN — FENTANYL CITRATE 25 MCG: 50 INJECTION, SOLUTION INTRAMUSCULAR; INTRAVENOUS at 14:57

## 2020-12-04 RX ADMIN — MIDAZOLAM HYDROCHLORIDE 2 MG: 1 INJECTION, SOLUTION INTRAMUSCULAR; INTRAVENOUS at 12:52

## 2020-12-04 RX ADMIN — OXYCODONE HYDROCHLORIDE AND ACETAMINOPHEN 1 TABLET: 5; 325 TABLET ORAL at 16:16

## 2020-12-04 RX ADMIN — SODIUM CHLORIDE, SODIUM LACTATE, POTASSIUM CHLORIDE, AND CALCIUM CHLORIDE 50 ML/HR: .6; .31; .03; .02 INJECTION, SOLUTION INTRAVENOUS at 15:08

## 2020-12-04 RX ADMIN — BUPIVACAINE 10 ML: 13.3 INJECTION, SUSPENSION, LIPOSOMAL INFILTRATION at 10:22

## 2020-12-04 RX ADMIN — PROPOFOL 200 MG: 10 INJECTION, EMULSION INTRAVENOUS at 12:56

## 2020-12-04 RX ADMIN — CEFAZOLIN 1000 MG: 1 INJECTION, POWDER, FOR SOLUTION INTRAVENOUS at 13:18

## 2020-12-04 RX ADMIN — FENTANYL CITRATE 50 MCG: 50 INJECTION, SOLUTION INTRAMUSCULAR; INTRAVENOUS at 15:07

## 2020-12-04 RX ADMIN — NEOSTIGMINE METHYLSULFATE 3 MG: 1 INJECTION, SOLUTION INTRAVENOUS at 14:52

## 2020-12-04 RX ADMIN — CEFAZOLIN SODIUM 1000 MG: 1 SOLUTION INTRAVENOUS at 12:18

## 2020-12-04 RX ADMIN — LIDOCAINE HYDROCHLORIDE 50 MG: 10 INJECTION, SOLUTION EPIDURAL; INFILTRATION; INTRACAUDAL; PERINEURAL at 12:56

## 2020-12-04 RX ADMIN — SODIUM CHLORIDE, SODIUM LACTATE, POTASSIUM CHLORIDE, AND CALCIUM CHLORIDE: .6; .31; .03; .02 INJECTION, SOLUTION INTRAVENOUS at 12:52

## 2020-12-09 ENCOUNTER — TELEPHONE (OUTPATIENT)
Dept: OBGYN CLINIC | Facility: HOSPITAL | Age: 55
End: 2020-12-09

## 2020-12-10 ENCOUNTER — OFFICE VISIT (OUTPATIENT)
Dept: OBGYN CLINIC | Facility: CLINIC | Age: 55
End: 2020-12-10

## 2020-12-10 ENCOUNTER — APPOINTMENT (OUTPATIENT)
Dept: RADIOLOGY | Facility: CLINIC | Age: 55
End: 2020-12-10
Payer: COMMERCIAL

## 2020-12-10 VITALS
HEIGHT: 70 IN | BODY MASS INDEX: 18.96 KG/M2 | HEART RATE: 89 BPM | WEIGHT: 132.4 LBS | DIASTOLIC BLOOD PRESSURE: 70 MMHG | RESPIRATION RATE: 20 BRPM | SYSTOLIC BLOOD PRESSURE: 105 MMHG

## 2020-12-10 DIAGNOSIS — Z48.89 AFTERCARE FOLLOWING SURGERY: ICD-10-CM

## 2020-12-10 DIAGNOSIS — M75.41 SUBACROMIAL IMPINGEMENT OF RIGHT SHOULDER: ICD-10-CM

## 2020-12-10 DIAGNOSIS — Z98.890 S/P ARTHROSCOPY OF RIGHT SHOULDER: Primary | ICD-10-CM

## 2020-12-10 PROCEDURE — 3008F BODY MASS INDEX DOCD: CPT | Performed by: ORTHOPAEDIC SURGERY

## 2020-12-10 PROCEDURE — 73030 X-RAY EXAM OF SHOULDER: CPT

## 2020-12-10 PROCEDURE — 99024 POSTOP FOLLOW-UP VISIT: CPT | Performed by: ORTHOPAEDIC SURGERY

## 2020-12-10 RX ORDER — OXYCODONE HYDROCHLORIDE AND ACETAMINOPHEN 5; 325 MG/1; MG/1
1 TABLET ORAL EVERY 6 HOURS PRN
Qty: 30 TABLET | Refills: 0 | Status: SHIPPED | OUTPATIENT
Start: 2020-12-10 | End: 2021-01-29

## 2020-12-16 ENCOUNTER — EVALUATION (OUTPATIENT)
Dept: PHYSICAL THERAPY | Facility: CLINIC | Age: 55
End: 2020-12-16
Payer: COMMERCIAL

## 2020-12-16 ENCOUNTER — TELEPHONE (OUTPATIENT)
Dept: OBGYN CLINIC | Facility: HOSPITAL | Age: 55
End: 2020-12-16

## 2020-12-16 DIAGNOSIS — M75.41 SUBACROMIAL IMPINGEMENT OF RIGHT SHOULDER: ICD-10-CM

## 2020-12-16 DIAGNOSIS — Z98.890 S/P ARTHROSCOPY OF RIGHT SHOULDER: Primary | ICD-10-CM

## 2020-12-16 PROCEDURE — 97162 PT EVAL MOD COMPLEX 30 MIN: CPT | Performed by: PHYSICAL THERAPIST

## 2020-12-16 PROCEDURE — 97110 THERAPEUTIC EXERCISES: CPT | Performed by: PHYSICAL THERAPIST

## 2020-12-17 DIAGNOSIS — Z98.890 S/P ARTHROSCOPY OF SHOULDER: Primary | ICD-10-CM

## 2020-12-17 RX ORDER — TRAMADOL HYDROCHLORIDE 50 MG/1
50 TABLET ORAL EVERY 8 HOURS PRN
Qty: 30 TABLET | Refills: 0 | Status: SHIPPED | OUTPATIENT
Start: 2020-12-17 | End: 2021-01-29

## 2020-12-22 ENCOUNTER — OFFICE VISIT (OUTPATIENT)
Dept: PHYSICAL THERAPY | Facility: CLINIC | Age: 55
End: 2020-12-22
Payer: COMMERCIAL

## 2020-12-22 DIAGNOSIS — M75.41 SUBACROMIAL IMPINGEMENT OF RIGHT SHOULDER: ICD-10-CM

## 2020-12-22 DIAGNOSIS — Z98.890 S/P ARTHROSCOPY OF RIGHT SHOULDER: Primary | ICD-10-CM

## 2020-12-22 PROCEDURE — 97110 THERAPEUTIC EXERCISES: CPT

## 2020-12-24 ENCOUNTER — OFFICE VISIT (OUTPATIENT)
Dept: PHYSICAL THERAPY | Facility: CLINIC | Age: 55
End: 2020-12-24
Payer: COMMERCIAL

## 2020-12-24 DIAGNOSIS — Z98.890 S/P ARTHROSCOPY OF RIGHT SHOULDER: Primary | ICD-10-CM

## 2020-12-24 DIAGNOSIS — M75.41 SUBACROMIAL IMPINGEMENT OF RIGHT SHOULDER: ICD-10-CM

## 2020-12-24 PROCEDURE — 97110 THERAPEUTIC EXERCISES: CPT | Performed by: PHYSICAL THERAPIST

## 2020-12-29 ENCOUNTER — CONSULT (OUTPATIENT)
Dept: RHEUMATOLOGY | Facility: CLINIC | Age: 55
End: 2020-12-29
Payer: COMMERCIAL

## 2020-12-29 ENCOUNTER — OFFICE VISIT (OUTPATIENT)
Dept: PHYSICAL THERAPY | Facility: CLINIC | Age: 55
End: 2020-12-29
Payer: COMMERCIAL

## 2020-12-29 VITALS
BODY MASS INDEX: 19.33 KG/M2 | SYSTOLIC BLOOD PRESSURE: 130 MMHG | HEIGHT: 70 IN | HEART RATE: 118 BPM | DIASTOLIC BLOOD PRESSURE: 86 MMHG | WEIGHT: 135 LBS | RESPIRATION RATE: 18 BRPM

## 2020-12-29 DIAGNOSIS — M75.41 SUBACROMIAL IMPINGEMENT OF RIGHT SHOULDER: ICD-10-CM

## 2020-12-29 DIAGNOSIS — M05.79 RHEUMATOID ARTHRITIS INVOLVING MULTIPLE SITES WITH POSITIVE RHEUMATOID FACTOR (HCC): Primary | ICD-10-CM

## 2020-12-29 DIAGNOSIS — M19.90 ARTHRITIS: ICD-10-CM

## 2020-12-29 DIAGNOSIS — Z98.890 S/P ARTHROSCOPY OF RIGHT SHOULDER: Primary | ICD-10-CM

## 2020-12-29 PROCEDURE — 99243 OFF/OP CNSLTJ NEW/EST LOW 30: CPT | Performed by: INTERNAL MEDICINE

## 2020-12-29 PROCEDURE — 4004F PT TOBACCO SCREEN RCVD TLK: CPT | Performed by: INTERNAL MEDICINE

## 2020-12-29 PROCEDURE — 97110 THERAPEUTIC EXERCISES: CPT

## 2020-12-29 RX ORDER — METHYLPREDNISOLONE 8 MG/1
TABLET ORAL
Qty: 26 TABLET | Refills: 3 | Status: SHIPPED | OUTPATIENT
Start: 2020-12-29 | End: 2021-01-14

## 2020-12-29 RX ORDER — OMEPRAZOLE 20 MG/1
20 CAPSULE, DELAYED RELEASE ORAL DAILY
Qty: 90 CAPSULE | Refills: 1 | Status: SHIPPED | OUTPATIENT
Start: 2020-12-29 | End: 2021-07-12

## 2020-12-29 RX ORDER — FOLIC ACID 1 MG/1
1 TABLET ORAL
Qty: 90 TABLET | Refills: 3 | Status: SHIPPED | OUTPATIENT
Start: 2020-12-29 | End: 2021-07-06 | Stop reason: SDUPTHER

## 2020-12-30 ENCOUNTER — APPOINTMENT (OUTPATIENT)
Dept: PHYSICAL THERAPY | Facility: CLINIC | Age: 55
End: 2020-12-30
Payer: COMMERCIAL

## 2020-12-31 ENCOUNTER — OFFICE VISIT (OUTPATIENT)
Dept: OBGYN CLINIC | Facility: CLINIC | Age: 55
End: 2020-12-31

## 2020-12-31 VITALS
BODY MASS INDEX: 19.33 KG/M2 | HEIGHT: 70 IN | SYSTOLIC BLOOD PRESSURE: 120 MMHG | DIASTOLIC BLOOD PRESSURE: 76 MMHG | WEIGHT: 135 LBS | HEART RATE: 105 BPM

## 2020-12-31 DIAGNOSIS — Z98.890 S/P ARTHROSCOPY OF RIGHT SHOULDER: Primary | ICD-10-CM

## 2020-12-31 PROCEDURE — 99024 POSTOP FOLLOW-UP VISIT: CPT | Performed by: ORTHOPAEDIC SURGERY

## 2020-12-31 PROCEDURE — 3008F BODY MASS INDEX DOCD: CPT | Performed by: INTERNAL MEDICINE

## 2020-12-31 RX ORDER — TRAMADOL HYDROCHLORIDE 50 MG/1
50 TABLET ORAL EVERY 8 HOURS PRN
Qty: 30 TABLET | Refills: 0 | Status: SHIPPED | OUTPATIENT
Start: 2020-12-31

## 2020-12-31 RX ORDER — NALOXONE HYDROCHLORIDE 4 MG/.1ML
SPRAY NASAL
Qty: 1 EACH | Refills: 1 | Status: SHIPPED | OUTPATIENT
Start: 2020-12-31 | End: 2020-12-31 | Stop reason: CLARIF

## 2021-01-04 ENCOUNTER — OFFICE VISIT (OUTPATIENT)
Dept: PHYSICAL THERAPY | Facility: CLINIC | Age: 56
End: 2021-01-04
Payer: COMMERCIAL

## 2021-01-04 DIAGNOSIS — M75.41 SUBACROMIAL IMPINGEMENT OF RIGHT SHOULDER: ICD-10-CM

## 2021-01-04 DIAGNOSIS — Z98.890 S/P ARTHROSCOPY OF RIGHT SHOULDER: Primary | ICD-10-CM

## 2021-01-04 PROCEDURE — 97110 THERAPEUTIC EXERCISES: CPT | Performed by: PHYSICAL THERAPIST

## 2021-01-04 PROCEDURE — 97112 NEUROMUSCULAR REEDUCATION: CPT | Performed by: PHYSICAL THERAPIST

## 2021-01-04 NOTE — PROGRESS NOTES
Daily Note     Today's date: 2021  Patient name: Ava Bills  : 1965  MRN: [id number]  Referring provider: Agnes Patrick DO  Dx:   Encounter Diagnosis     ICD-10-CM    1  S/P arthroscopy of right shoulder  Z98 890    2  Subacromial impingement of right shoulder  M75 41        Start Time: 1058  Stop Time: 1133  Total time in clinic (min): 35 minutes    Subjective: Pt reports his shoulder has been feeling better, partly because he was put on a new medication by his rheumatologist        Objective: See treatment diary below      Assessment: Pt's R shoulder PROM is WNL with empty end feel in all planes, some pain at end range in all planes  Pt able to tolerate progression of program, with soreness noted at end range  Difficulty achieving full range flex/scap in standing secondary to pain  Good motor control during iso walkouts  Plan: Progress as tolerated       Precautions: R shoulder arthroscopy ()      Manuals                                                             Neuro Re-Ed             Scap ret HEP 3"x20 3"x20 3"x20         TB rows/ext     GTB 20x ea        TB IR/ER iso walkout     YTB 5"x10        LT ER     20x                                               Ther Ex             R shoulder PROM  20' 20' 17' 10'        Table slides flex/scap/er HEP 10"x10 10"x10 10"x10         Cane flex/abd/er   10"x10 10"x10         pulleys    3' 3'        AROM flex/scap     2x10 ea        FR up wall     2x10        Hi-lo walkaway     10"x10                     Ther Activity                                       Gait Training                                       Modalities             CP  Pt deferred def Pt deferred

## 2021-01-06 ENCOUNTER — OFFICE VISIT (OUTPATIENT)
Dept: PHYSICAL THERAPY | Facility: CLINIC | Age: 56
End: 2021-01-06
Payer: COMMERCIAL

## 2021-01-06 DIAGNOSIS — M75.41 SUBACROMIAL IMPINGEMENT OF RIGHT SHOULDER: ICD-10-CM

## 2021-01-06 DIAGNOSIS — Z98.890 S/P ARTHROSCOPY OF RIGHT SHOULDER: Primary | ICD-10-CM

## 2021-01-06 PROCEDURE — 97112 NEUROMUSCULAR REEDUCATION: CPT

## 2021-01-06 PROCEDURE — 97110 THERAPEUTIC EXERCISES: CPT

## 2021-01-06 NOTE — PROGRESS NOTES
Daily Note     Today's date: 2021  Patient name: Felecia Morales  : 1965  MRN: [id number]  Referring provider: Cullen Tena DO  Dx:   Encounter Diagnosis     ICD-10-CM    1  S/P arthroscopy of right shoulder  Z98 890    2  Subacromial impingement of right shoulder  M75 41                   Subjective: Patient reports he is feeling "better" overall  Objective: See treatment diary below      Assessment: Patient is presenting with significantly improved tolerance to all charted exercises  Minimal R shoulder muscle fatigue was present post session  Patient presenting with firm end feel during PROM which is [facility] in all planes at this time  Plan: Continue per plan of care        Precautions: R shoulder arthroscopy ()      Manuals                                                            Neuro Re-Ed             Scap ret HEP 3"x20 3"x20 3"x20         TB rows/ext     GTB 20x ea GTB x20 ea       TB IR/ER iso walkout     YTB 5"x10 YTB 5"x10       LT ER     20x 3"x20                                              Ther Ex             R shoulder PROM  20' 20' 17' 10' 10'       Table slides flex/scap/er HEP 10"x10 10"x10 10"x10         Cane flex/abd/er   10"x10 10"x10  10"x10       pulleys    3' 3' 3'       AROM flex/scap     2x10 ea 2x10 ea supine/stand       FR up wall     2x10 2x10       Hi-lo walkaway     10"x10 10"x10                    Ther Activity                                       Gait Training                                       Modalities             CP  Pt deferred def Pt deferred  Pt deferred

## 2021-01-11 ENCOUNTER — OFFICE VISIT (OUTPATIENT)
Dept: PHYSICAL THERAPY | Facility: CLINIC | Age: 56
End: 2021-01-11
Payer: COMMERCIAL

## 2021-01-11 DIAGNOSIS — M75.41 SUBACROMIAL IMPINGEMENT OF RIGHT SHOULDER: ICD-10-CM

## 2021-01-11 DIAGNOSIS — Z98.890 S/P ARTHROSCOPY OF RIGHT SHOULDER: Primary | ICD-10-CM

## 2021-01-11 PROCEDURE — 97112 NEUROMUSCULAR REEDUCATION: CPT | Performed by: PHYSICAL THERAPIST

## 2021-01-11 PROCEDURE — 97110 THERAPEUTIC EXERCISES: CPT | Performed by: PHYSICAL THERAPIST

## 2021-01-11 NOTE — PROGRESS NOTES
Daily Note     Today's date: 2021  Patient name: Amy Holley  : 1965  MRN: [id number]  Referring provider: Bert Campos DO  Dx:   Encounter Diagnosis     ICD-10-CM    1  S/P arthroscopy of right shoulder  Z98 890    2  Subacromial impingement of right shoulder  M75 41        Start Time: 1057  Stop Time: 1139  Total time in clinic (min): 42 minutes    Subjective: Pt reports his shoulder is feeling okay  Objective: See treatment diary below      Assessment: Pt able to progress in shoulder strengthening and AROM exercises with minimal discomfort  Able to achieve full range AROM in flex and abd in supine with minimal discomfort at end range  Plan: Progress as tolerated        Precautions: R shoulder arthroscopy ()      Manuals                                                           Neuro Re-Ed             Scap ret HEP 3"x20 3"x20 3"x20         TB rows/ext     GTB 20x ea GTB x20 ea GTB 30x ea      TB IR/ER iso walkout     YTB 5"x10 YTB 5"x10 YTB 5"x10      LT ER     20x 3"x20 3"x20                                             Ther Ex             R shoulder PROM  20' 20' 17' 10' 10' 8'      Table slides flex/scap/er HEP 10"x10 10"x10 10"x10         Cane flex/abd/er   10"x10 10"x10  10"x10       pulleys    3' 3' 3' 3'      AROM flex/scap     2x10 ea 2x10 ea supine/stand 20x ea sup/stand      FR up wall     2x10 2x10 20x      TB IR/ER       RTB 20x ea      Hi-lo walkaway     10"x10 10"x10 10"x10      S/L ER/abd       20x ea      Ther Activity                                       Gait Training                                       Modalities             CP  Pt deferred def Pt deferred  Pt deferred Pt def

## 2021-01-13 ENCOUNTER — OFFICE VISIT (OUTPATIENT)
Dept: PHYSICAL THERAPY | Facility: CLINIC | Age: 56
End: 2021-01-13
Payer: COMMERCIAL

## 2021-01-13 DIAGNOSIS — M75.41 SUBACROMIAL IMPINGEMENT OF RIGHT SHOULDER: ICD-10-CM

## 2021-01-13 DIAGNOSIS — Z98.890 S/P ARTHROSCOPY OF RIGHT SHOULDER: Primary | ICD-10-CM

## 2021-01-13 PROCEDURE — 97110 THERAPEUTIC EXERCISES: CPT

## 2021-01-13 PROCEDURE — 97112 NEUROMUSCULAR REEDUCATION: CPT

## 2021-01-13 NOTE — PROGRESS NOTES
Daily Note     Today's date: 2021  Patient name: Enrique Retana  : 1965  MRN: [id number]  Referring provider: Rand Lraios DO  Dx:   Encounter Diagnosis     ICD-10-CM    1  S/P arthroscopy of right shoulder  Z98 890    2  Subacromial impingement of right shoulder  M75 41                   Subjective: Patient's primary complaint is R shoulder soreness  Patient feels he has been doing more and may be overdoing it with use of R shoulder  Objective: See treatment diary below      Assessment: Rest encouraged to reduce inflammation in R shoulder  Patient demonstrated verbal understanding  Decreased periscapular endurance is present during charted exercises  ROM is WFL actively and passively at this time without pain  Pt would benefit from continued PT in order to improve R shoulder strength and endurance for improved function during ADLs  Plan: Continue per plan of care        Precautions: R shoulder arthroscopy ()      Manuals                                                          Neuro Re-Ed             Scap ret HEP 3"x20 3"x20 3"x20         TB rows/ext     GTB 20x ea GTB x20 ea GTB 30x ea GTB x30 ea     TB IR/ER iso walkout     YTB 5"x10 YTB 5"x10 YTB 5"x10 RTB 5"x10     LT ER     20x 3"x20 3"x20 YTB 3"x20     Prone I T        2x10                               Ther Ex             R shoulder PROM  20' 20' 17' 10' 10' 8' 8'     Table slides flex/scap/er HEP 10"x10 10"x10 10"x10         Cane flex/abd/er   10"x10 10"x10  10"x10       pulleys    3' 3' 3' 3' 3'     AROM flex/scap     2x10 ea 2x10 ea supine/stand 20x ea sup/stand x20 ea sup/stand     FR up wall     2x10 2x10 20x x20     TB IR/ER       RTB 20x ea RTB x20 ea     Hi-lo walkaway     10"x10 10"x10 10"x10 10"x10     S/L ER/abd       20x ea x20 ea     Ther Activity                                       Gait Training                                       Modalities             CP  Pt deferred def Pt deferred  Pt deferred Pt def Pt def

## 2021-01-18 ENCOUNTER — APPOINTMENT (OUTPATIENT)
Dept: PHYSICAL THERAPY | Facility: CLINIC | Age: 56
End: 2021-01-18
Payer: COMMERCIAL

## 2021-01-20 ENCOUNTER — OFFICE VISIT (OUTPATIENT)
Dept: PHYSICAL THERAPY | Facility: CLINIC | Age: 56
End: 2021-01-20
Payer: COMMERCIAL

## 2021-01-20 DIAGNOSIS — Z98.890 S/P ARTHROSCOPY OF RIGHT SHOULDER: Primary | ICD-10-CM

## 2021-01-20 DIAGNOSIS — M75.41 SUBACROMIAL IMPINGEMENT OF RIGHT SHOULDER: ICD-10-CM

## 2021-01-20 PROCEDURE — 97110 THERAPEUTIC EXERCISES: CPT

## 2021-01-20 PROCEDURE — 97112 NEUROMUSCULAR REEDUCATION: CPT

## 2021-01-20 NOTE — PROGRESS NOTES
Daily Note     Today's date: 2021  Patient name: Claude Edwards  : 1965  MRN: [id number]  Referring provider: Pedro De Leon DO  Dx:   Encounter Diagnosis     ICD-10-CM    1  S/P arthroscopy of right shoulder  Z98 890    2  Subacromial impingement of right shoulder  M75 41                   Subjective: Pt reports R shoulder is sore  Pt states he has been working and is mostly concerned about the overall constant soreness of R shoulder  Pt reports approximately 70% improvement overall  Patient states R shoulder is still lacking strength  Objective: See treatment diary below      Assessment: Patient presenting with lack of proper scapulohumeral mechanics  Challenged with lower trap strengthening secondary to weakness  Patient is able to complete charted exercises without exacerbation of sx  Some TE not performed today secondary to patient having to return to work  Plan: Continue per plan of care        Precautions: R shoulder arthroscopy ()      Manuals                                                         Neuro Re-Ed             Scap ret HEP 3"x20 3"x20 3"x20         TB rows/ext     GTB 20x ea GTB x20 ea GTB 30x ea GTB x30 ea GTB x30 ea    TB IR/ER iso walkout     YTB 5"x10 YTB 5"x10 YTB 5"x10 RTB 5"x10     LT ER     20x 3"x20 3"x20 YTB 3"x20 YTB 3"x20            Prone I T        2x10 2x10                              Ther Ex             R shoulder PROM  20' 20' 17' 10' 10' 8' 8' 6'    Table slides flex/scap/er HEP 10"x10 10"x10 10"x10         Cane flex/abd/er   10"x10 10"x10  10"x10       pulleys    3' 3' 3' 3' 3' 3'    AROM flex/scap     2x10 ea 2x10 ea supine/stand 20x ea sup/stand x20 ea sup/stand 1# 2x10 stand    FR up wall     2x10 2x10 20x x20 x20    TB IR/ER       RTB 20x ea RTB x20 ea RTB x20 ea    Hi-lo walkaway     10"x10 10"x10 10"x10 10"x10     S/L ER/abd       20x ea x20 ea 1# x20 ea    Ther Activity Gait Training                                       Modalities             CP  Pt deferred def Pt deferred  Pt deferred Pt def Pt def

## 2021-01-25 ENCOUNTER — OFFICE VISIT (OUTPATIENT)
Dept: PHYSICAL THERAPY | Facility: CLINIC | Age: 56
End: 2021-01-25
Payer: COMMERCIAL

## 2021-01-25 DIAGNOSIS — Z98.890 S/P ARTHROSCOPY OF RIGHT SHOULDER: Primary | ICD-10-CM

## 2021-01-25 DIAGNOSIS — M75.41 SUBACROMIAL IMPINGEMENT OF RIGHT SHOULDER: ICD-10-CM

## 2021-01-25 PROCEDURE — 97530 THERAPEUTIC ACTIVITIES: CPT | Performed by: PHYSICAL THERAPIST

## 2021-01-25 PROCEDURE — 97112 NEUROMUSCULAR REEDUCATION: CPT | Performed by: PHYSICAL THERAPIST

## 2021-01-25 PROCEDURE — 97110 THERAPEUTIC EXERCISES: CPT | Performed by: PHYSICAL THERAPIST

## 2021-01-25 NOTE — PROGRESS NOTES
PT Re-Evaluation     Today's date: 2021  Patient name: Amy Holley  : 1965  MRN: [id number]  Referring provider: Bert Campos DO  Dx:   Encounter Diagnosis     ICD-10-CM    1  S/P arthroscopy of right shoulder  Z98 890    2  Subacromial impingement of right shoulder  M75 41        Start Time: 1100  Stop Time: 1132  Total time in clinic (min): 32 minutes    Assessment  Assessment details: Pt's A/PROM, strength, and pain have all improved significantly since IE  Pt challenged with endurance and stability exercises today secondary to minimal weakness  Pt has been able to return to work with minimal limitations  Pt would benefit from 1 more visit in order to become independent with HEP, pt agreeable to this  Impairments: abnormal or restricted ROM, activity intolerance, impaired physical strength, lacks appropriate home exercise program, pain with function, scapular dyskinesis, weight-bearing intolerance, poor posture  and poor body mechanics  Functional limitations: sleeping, dressing, reaching  Symptom irritability: moderateUnderstanding of Dx/Px/POC: good   Prognosis: good    Goals  ST weeks  1  Pt will demonstrate independence with HEP  met  2  Pt will improve R shoulder A/PROM by at least 10%  met  3  Pt will improve R shoulder strength by at least 1/2 grade  met  4  Pt will report pain no more than 5/10  In progress    LT weeks  1  Pt will improve R shoulder AROM to equal that on the L to return to PLOF  met  2  Pt will improve R shoulder strength to at least 4+/5  In progress  3  Pt will have no difficulty reaching OH  met  4  Pt will report pain no more than 2/10   In progress      Plan  Patient would benefit from: skilled physical therapy  Planned modality interventions: cryotherapy and thermotherapy: hydrocollator packs  Planned therapy interventions: therapeutic exercise, therapeutic activities, stretching, strengthening, patient education, neuromuscular re-education, massage, manual therapy, balance, gait training and home exercise program  Frequency: 1x week  Duration in weeks: 1  Treatment plan discussed with: patient        Subjective Evaluation    History of Present Illness  Date of surgery: 2020  Mechanism of injury: surgery  Mechanism of injury: Pt reports his shoulder function and pain has gotten a lot better  He has gone back to work, but tries to avoid straining because that is when he feels pulling in the shoulder  He reports he is able to do all of his daily activities with very minimal pain and no limitations             Not a recurrent problem   Quality of life: fair    Pain  Current pain ratin  At best pain ratin  At worst pain ratin  Quality: sharp, burning and needle-like  Relieving factors: medications  Aggravating factors: overhead activity and lifting  Progression: worsening    Treatments  Previous treatment: physical therapy  Patient Goals  Patient goals for therapy: decreased pain, increased motion, increased strength, independence with ADLs/IADLs, return to work and return to sport/leisure activities          Objective     Active Range of Motion     Right Shoulder   Flexion: 162 degrees   Abduction: 158 degrees     Passive Range of Motion     Right Shoulder   Flexion: WFL  Abduction: WFL  External rotation 0°: WFL    Strength/Myotome Testing     Right Shoulder     Planes of Motion   Flexion: 4   Abduction: 4   External rotation at 0°: 4   Internal rotation at 0°: 4+        Precautions: R shoulder arthroscopy ()      Manuals                                                        Neuro Re-Ed             Scap ret HEP 3"x20 3"x20 3"x20         TB rows/ext     GTB 20x ea GTB x20 ea GTB 30x ea GTB x30 ea GTB x30 ea BTB 20x ea   TB IR/ER iso walkout     YTB 5"x10 YTB 5"x10 YTB 5"x10 RTB 5"x10     LT ER     20x 3"x20 3"x20 YTB 3"x20 YTB 3"x20         RTB 3"x20   Prone I T        2x10 2x10 1# 20x   ABC 1x                Ther Ex             R shoulder PROM  20' 20' 17' 10' 10' 8' 8' 6' np   Table slides flex/scap/er HEP 10"x10 10"x10 10"x10         Cane flex/abd/er   10"x10 10"x10  10"x10       UBE          3'/3'   pulleys    3' 3' 3' 3' 3' 3'    AROM flex/scap     2x10 ea 2x10 ea supine/stand 20x ea sup/stand x20 ea sup/stand 1# 2x10 stand 1# 30x stand   FR up wall     2x10 2x10 20x x20 x20 YTB 10x no FR   TB IR/ER       RTB 20x ea RTB x20 ea RTB x20 ea RTB 30x ea   Hi-lo walkaway     10"x10 10"x10 10"x10 10"x10     S/L ER/abd       20x ea x20 ea 1# x20 ea 1# 3x10   Ther Activity             OH carry          RMB 3 laps   90* carry          RMB 3 laps   Gait Training                                       Modalities             CP  Pt deferred def Pt deferred  Pt deferred Pt def Pt def

## 2021-01-27 ENCOUNTER — OFFICE VISIT (OUTPATIENT)
Dept: PHYSICAL THERAPY | Facility: CLINIC | Age: 56
End: 2021-01-27
Payer: COMMERCIAL

## 2021-01-27 DIAGNOSIS — Z98.890 S/P ARTHROSCOPY OF RIGHT SHOULDER: Primary | ICD-10-CM

## 2021-01-27 DIAGNOSIS — M75.41 SUBACROMIAL IMPINGEMENT OF RIGHT SHOULDER: ICD-10-CM

## 2021-01-27 PROCEDURE — 97110 THERAPEUTIC EXERCISES: CPT | Performed by: PHYSICAL THERAPIST

## 2021-01-27 PROCEDURE — 97112 NEUROMUSCULAR REEDUCATION: CPT | Performed by: PHYSICAL THERAPIST

## 2021-01-27 NOTE — PROGRESS NOTES
PT Discharge    Today's date: 2021  Patient name: Abiola Wheeler  : 1965  MRN: [id number]  Referring provider: Rena Brambila DO  Dx:   Encounter Diagnosis     ICD-10-CM    1  S/P arthroscopy of right shoulder  Z98 890    2  Subacromial impingement of right shoulder  M75 41        Start Time: 1203  Stop Time: 1232  Total time in clinic (min): 29 minutes    Assessment  Assessment details: Pt has met all goals and has reached therapeutic potential  He is back to work and PLOF with minimal limitations  Pt DC from skilled therapy, as discussed last visit  Impairments: abnormal or restricted ROM, activity intolerance, impaired physical strength, lacks appropriate home exercise program, pain with function, scapular dyskinesis, weight-bearing intolerance, poor posture  and poor body mechanics  Functional limitations: sleeping, dressing, reaching  Symptom irritability: moderateUnderstanding of Dx/Px/POC: good   Prognosis: good    Goals  ST weeks  1  Pt will demonstrate independence with HEP  met  2  Pt will improve R shoulder A/PROM by at least 10%  met  3  Pt will improve R shoulder strength by at least 1/2 grade  met  4  Pt will report pain no more than 5/10  met    LT weeks  1  Pt will improve R shoulder AROM to equal that on the L to return to PLOF  met  2  Pt will improve R shoulder strength to at least 4+/5  met  3  Pt will have no difficulty reaching OH  met  4  Pt will report pain no more than 2/10   met      Plan  Patient would benefit from: skilled physical therapy  Planned modality interventions: cryotherapy and thermotherapy: hydrocollator packs  Planned therapy interventions: therapeutic exercise, therapeutic activities, stretching, strengthening, patient education, neuromuscular re-education, massage, manual therapy, balance, gait training and home exercise program  Treatment plan discussed with: patient        Subjective Evaluation    History of Present Illness  Date of surgery: 2020  Mechanism of injury: surgery  Mechanism of injury: Pt reports his shoulder function and pain has gotten a lot better  He has gone back to work, but tries to avoid straining because that is when he feels pulling in the shoulder  He reports he is able to do all of his daily activities with very minimal pain and no limitations             Not a recurrent problem   Quality of life: fair    Pain  Current pain ratin  At best pain ratin  At worst pain ratin  Quality: sharp, burning and needle-like  Relieving factors: medications  Aggravating factors: overhead activity and lifting  Progression: worsening    Treatments  Previous treatment: physical therapy  Patient Goals  Patient goals for therapy: decreased pain, increased motion, increased strength, independence with ADLs/IADLs, return to work and return to sport/leisure activities          Objective     Active Range of Motion     Right Shoulder   Flexion: 162 degrees   Abduction: 158 degrees     Passive Range of Motion     Right Shoulder   Flexion: WFL  Abduction: WFL  External rotation 0°: WFL    Strength/Myotome Testing     Right Shoulder     Planes of Motion   Flexion: 4   Abduction: 4   External rotation at 0°: 4   Internal rotation at 0°: 4+       Flowsheet Rows      Most Recent Value   PT/OT G-Codes   Current Score  48   Projected Score  64       Precautions: R shoulder arthroscopy ()      Manuals                                                        Neuro Re-Ed             Scap ret  3"x20 3"x20 3"x20         TB rows/ext BTB 20x ea    GTB 20x ea GTB x20 ea GTB 30x ea GTB x30 ea GTB x30 ea BTB 20x ea   TB IR/ER iso walkout     YTB 5"x10 YTB 5"x10 YTB 5"x10 RTB 5"x10     LT ER RTB 3"x20    20x 3"x20 3"x20 YTB 3"x20 YTB 3"x20         RTB 3"x20   Prone I T 1# 20x       2x10 2x10 1# 20x   ABC          1x                Ther Ex             R shoulder PROM  20' 20' 17' 10' 10' 8' 8' 6' np   Table slides flex/scap/er  10"x10 10"x10 10"x10         Cane flex/abd/er   10"x10 10"x10  10"x10       UBE 3'/3'         3'/3'   pulleys    3' 3' 3' 3' 3' 3'    AROM flex/scap RTB 20x ea    2x10 ea 2x10 ea supine/stand 20x ea sup/stand x20 ea sup/stand 1# 2x10 stand 1# 30x stand   FR up wall     2x10 2x10 20x x20 x20 YTB 10x no FR   TB IR/ER GTB 20x ea      RTB 20x ea RTB x20 ea RTB x20 ea RTB 30x ea   Hi-lo walkaway     10"x10 10"x10 10"x10 10"x10     S/L ER/abd 2# 20x ea      20x ea x20 ea 1# x20 ea 1# 3x10   Ther Activity             OH carry          RMB 3 laps   90* carry          RMB 3 laps   Gait Training                                       Modalities             CP  Pt deferred def Pt deferred  Pt deferred Pt def Pt def

## 2021-01-29 ENCOUNTER — TELEPHONE (OUTPATIENT)
Dept: FAMILY MEDICINE CLINIC | Facility: CLINIC | Age: 56
End: 2021-01-29

## 2021-01-29 ENCOUNTER — OFFICE VISIT (OUTPATIENT)
Dept: FAMILY MEDICINE CLINIC | Facility: CLINIC | Age: 56
End: 2021-01-29
Payer: COMMERCIAL

## 2021-01-29 VITALS
SYSTOLIC BLOOD PRESSURE: 120 MMHG | OXYGEN SATURATION: 98 % | TEMPERATURE: 98.4 F | HEIGHT: 70 IN | WEIGHT: 145.8 LBS | BODY MASS INDEX: 20.87 KG/M2 | HEART RATE: 98 BPM | DIASTOLIC BLOOD PRESSURE: 60 MMHG

## 2021-01-29 DIAGNOSIS — M06.9 RHEUMATOID ARTHRITIS, INVOLVING UNSPECIFIED SITE, UNSPECIFIED WHETHER RHEUMATOID FACTOR PRESENT (HCC): ICD-10-CM

## 2021-01-29 DIAGNOSIS — Z09 FOLLOW UP: Primary | ICD-10-CM

## 2021-01-29 DIAGNOSIS — Z12.2 ENCOUNTER FOR SCREENING FOR LUNG CANCER: ICD-10-CM

## 2021-01-29 PROCEDURE — 4004F PT TOBACCO SCREEN RCVD TLK: CPT | Performed by: STUDENT IN AN ORGANIZED HEALTH CARE EDUCATION/TRAINING PROGRAM

## 2021-01-29 PROCEDURE — 99214 OFFICE O/P EST MOD 30 MIN: CPT | Performed by: STUDENT IN AN ORGANIZED HEALTH CARE EDUCATION/TRAINING PROGRAM

## 2021-01-29 PROCEDURE — 3008F BODY MASS INDEX DOCD: CPT | Performed by: STUDENT IN AN ORGANIZED HEALTH CARE EDUCATION/TRAINING PROGRAM

## 2021-01-29 PROCEDURE — 3725F SCREEN DEPRESSION PERFORMED: CPT | Performed by: STUDENT IN AN ORGANIZED HEALTH CARE EDUCATION/TRAINING PROGRAM

## 2021-01-29 RX ORDER — MELOXICAM 15 MG/1
15 TABLET ORAL DAILY
Qty: 30 TABLET | Refills: 0 | Status: SHIPPED | OUTPATIENT
Start: 2021-01-29 | End: 2021-03-01

## 2021-01-29 NOTE — TELEPHONE ENCOUNTER
Per pt, he spoke with insurance company between 1/27 (original appt date) and 1/29 and they changed his pcp to Dr Kelton Habermann

## 2021-01-29 NOTE — PROGRESS NOTES
Assessment/Plan:         Problem List Items Addressed This Visit     None      Visit Diagnoses     Follow up    -  Primary    Rheumatoid arthritis, involving unspecified site, unspecified whether rheumatoid factor present (HCC)        Relevant Medications    meloxicam (MOBIC) 15 mg tablet    Encounter for screening for lung cancer        Relevant Orders    CT lung screening program        To avoid prednisone, will try mobic  Has follow up with rheum for RA in the next few weeks  Has follow up with ortho  Subjective:      Patient ID: Deisi Malone is a 55 y o  male  HPI  Coming in to follow up shoulder pain, RA, and discuss labs  Seen by Rheumatology and diagnosed with RA  Started on MTX and prednisone but asking if there is anything else because when he is off prednisone, symptoms return  Shoulder is improving as well with recent surgery  The following portions of the patient's history were reviewed and updated as appropriate:   Past Medical History:  He has a past medical history of History of transfusion, Osteoarthrosis, and Pneumonia  ,  _______________________________________________________________________  Medical Problems:  does not have any pertinent problems on file ,  _______________________________________________________________________  Past Surgical History:   has a past surgical history that includes Throat surgery; Neck surgery; FL injection right shoulder (arthrogram) (9/28/2020); Knee surgery (Left); Ankle surgery (Left); Tonsillectomy; and pr shldr arthroscop,surg,w/rotat cuff repr (Right, 12/4/2020)  ,  _______________________________________________________________________  Family History:  Family history is unknown by patient  ,  _______________________________________________________________________  Social History:   reports that he has been smoking cigarettes  He has a 37 50 pack-year smoking history   He has never used smokeless tobacco  He reports that he does not drink alcohol or use drugs  ,  _______________________________________________________________________  Allergies:  has No Known Allergies     _______________________________________________________________________  Current Outpatient Medications   Medication Sig Dispense Refill    folic acid (FOLVITE) 1 mg tablet Take 1 tablet (1 mg total) by mouth daily at bedtime 90 tablet 3    methotrexate 2 5 mg tablet Take 4 tabs by mouth once a week with breakfast 18 tablet 5    omeprazole (PriLOSEC) 20 mg delayed release capsule Take 1 capsule (20 mg total) by mouth daily 90 capsule 1    traMADol (ULTRAM) 50 mg tablet Take 1 tablet (50 mg total) by mouth every 8 (eight) hours as needed for moderate pain 30 tablet 0    meloxicam (MOBIC) 15 mg tablet Take 1 tablet (15 mg total) by mouth daily 30 tablet 0     No current facility-administered medications for this visit       _______________________________________________________________________  Review of Systems   Constitutional: Negative for chills, fatigue and fever  HENT: Negative for rhinorrhea and sore throat  Eyes: Negative for visual disturbance  Respiratory: Negative for cough and shortness of breath  Cardiovascular: Negative for chest pain and palpitations  Gastrointestinal: Negative for abdominal pain, constipation, diarrhea, nausea and vomiting  Genitourinary: Negative for difficulty urinating, dysuria and frequency  Musculoskeletal: Positive for arthralgias and myalgias  Skin: Negative for color change and rash  Neurological: Negative for weakness and headaches  Objective:  Vitals:    01/29/21 0957   BP: 120/60   BP Location: Left arm   Patient Position: Sitting   Cuff Size: Adult   Pulse: 98   Temp: 98 4 °F (36 9 °C)   TempSrc: Temporal   SpO2: 98%   Weight: 66 1 kg (145 lb 12 8 oz)   Height: 5' 10" (1 778 m)     Body mass index is 20 92 kg/m²  Physical Exam  Constitutional:       General: He is not in acute distress       Appearance: He is not ill-appearing  HENT:      Head: Normocephalic and atraumatic  Right Ear: External ear normal       Left Ear: External ear normal       Nose: Nose normal  No congestion or rhinorrhea  Mouth/Throat:      Mouth: Mucous membranes are moist       Pharynx: Oropharynx is clear  No oropharyngeal exudate or posterior oropharyngeal erythema  Eyes:      Extraocular Movements: Extraocular movements intact  Conjunctiva/sclera: Conjunctivae normal       Pupils: Pupils are equal, round, and reactive to light  Neck:      Musculoskeletal: Normal range of motion  Cardiovascular:      Rate and Rhythm: Normal rate and regular rhythm  Pulses: Normal pulses  Heart sounds: No murmur  Pulmonary:      Effort: Pulmonary effort is normal  No respiratory distress  Breath sounds: Normal breath sounds  No wheezing  Chest:      Chest wall: No tenderness  Abdominal:      General: Bowel sounds are normal       Palpations: Abdomen is soft  Tenderness: There is no abdominal tenderness  Musculoskeletal: Normal range of motion  Skin:     General: Skin is warm and dry  Capillary Refill: Capillary refill takes less than 2 seconds  Findings: No rash  Neurological:      General: No focal deficit present  Mental Status: He is alert  Mental status is at baseline

## 2021-02-03 ENCOUNTER — OFFICE VISIT (OUTPATIENT)
Dept: OBGYN CLINIC | Facility: CLINIC | Age: 56
End: 2021-02-03

## 2021-02-03 VITALS
DIASTOLIC BLOOD PRESSURE: 77 MMHG | SYSTOLIC BLOOD PRESSURE: 122 MMHG | HEIGHT: 70 IN | BODY MASS INDEX: 21.19 KG/M2 | HEART RATE: 76 BPM | WEIGHT: 148 LBS

## 2021-02-03 DIAGNOSIS — Z98.890 S/P ARTHROSCOPY OF RIGHT SHOULDER: Primary | ICD-10-CM

## 2021-02-03 PROCEDURE — 99024 POSTOP FOLLOW-UP VISIT: CPT | Performed by: ORTHOPAEDIC SURGERY

## 2021-02-03 PROCEDURE — 3008F BODY MASS INDEX DOCD: CPT | Performed by: STUDENT IN AN ORGANIZED HEALTH CARE EDUCATION/TRAINING PROGRAM

## 2021-02-03 NOTE — PROGRESS NOTES
Patient Name:  Michael Duncan  MRN:  [id number]    Assessment & Plan     There are no diagnoses linked to this encounter  71-year-old male approximately 9 weeks status post Right arthroscopic acromioplasty, distal clavicle excision and open subpectoral biceps tenodesis performed 12/04/2020  Patient progressing well, but in need of strengthening of right upper extremity  Advised patient to continue with PT for strengthening, ROM and modalities as needed; new script placed today  Caution when lifting heavy items at work  Will follow up in office in 4 weeks for reevaluation  History of the Present Illness   Michael Duncan is a 55 y o  male approximately 9 weeks status post  Right arthroscopic acromioplasty, distal clavicle excision and open subpectoral biceps tenodesis performed 12/04/2020  Today, patient reports he is back to work about 3 days per week  Admits "about 90%" improvement in range of motion and "about 70-80%" improvement in strength  He has been performing HEP and has been discharged from formal outpatient PT  He admits he would benefit from strengthening  Patient reports a discomfort in Right shoulder when lifting heavy items, such irwin water tanks, approximately 300 lbs, with his son at work  Patient also states he continues to feel the end of a monocryl suture at site of open biceps tenodesis  Denies new problems, numbness or tingling  Review of Systems     Review of Systems   Constitutional: Negative for chills and fever  HENT: Negative for ear pain and sore throat  Eyes: Negative for pain and visual disturbance  Respiratory: Negative for cough and shortness of breath  Cardiovascular: Negative for chest pain and palpitations  Gastrointestinal: Negative for abdominal pain and vomiting  Genitourinary: Negative for dysuria and hematuria  Musculoskeletal: Negative for arthralgias, back pain and gait problem  Skin: Negative for color change and rash     Neurological: Negative for seizures and syncope  All other systems reviewed and are negative  Physical Exam     There were no vitals taken for this visit      right shoulder: Active range of motion to  165 degrees forward flexion,  165 degrees abduction,  85 degrees external rotation, and internal rotation to  Right sacrum  There are no signs of erythema,  Dehiscence,  Warmth or rash over surgical incisions  Small Monocryl knot noted at medial aspect  Of open biceps tenodesis surgical incision  Empty can testing is  Negative with some discomfort  There is  improving strength with external rotation testing at the side  Thurman test is  negative  Schoolcraft's test is  negative  There is no tenderness about the rhomboid and trapezial musculature  The patient is neurovascularly intact distally in the extremity  Data Review     I have personally reviewed pertinent films in PACS, and my interpretation follows        No imaging needed    Social History     Tobacco Use    Smoking status: Current Every Day Smoker     Packs/day: 1 50     Years: 25 00     Pack years: 37 50     Types: Cigarettes    Smokeless tobacco: Never Used   Substance Use Topics    Alcohol use: No    Drug use: No           Procedures    none    Evelio Prado PA-C

## 2021-02-27 DIAGNOSIS — M06.9 RHEUMATOID ARTHRITIS, INVOLVING UNSPECIFIED SITE, UNSPECIFIED WHETHER RHEUMATOID FACTOR PRESENT (HCC): ICD-10-CM

## 2021-03-01 RX ORDER — MELOXICAM 15 MG/1
TABLET ORAL
Qty: 30 TABLET | Refills: 0 | Status: SHIPPED | OUTPATIENT
Start: 2021-03-01 | End: 2021-03-30

## 2021-03-30 DIAGNOSIS — M06.9 RHEUMATOID ARTHRITIS, INVOLVING UNSPECIFIED SITE, UNSPECIFIED WHETHER RHEUMATOID FACTOR PRESENT (HCC): ICD-10-CM

## 2021-03-30 RX ORDER — MELOXICAM 15 MG/1
TABLET ORAL
Qty: 30 TABLET | Refills: 0 | Status: SHIPPED | OUTPATIENT
Start: 2021-03-30 | End: 2021-04-26

## 2021-04-26 DIAGNOSIS — M06.9 RHEUMATOID ARTHRITIS, INVOLVING UNSPECIFIED SITE, UNSPECIFIED WHETHER RHEUMATOID FACTOR PRESENT (HCC): ICD-10-CM

## 2021-04-26 RX ORDER — MELOXICAM 15 MG/1
TABLET ORAL
Qty: 30 TABLET | Refills: 0 | Status: SHIPPED | OUTPATIENT
Start: 2021-04-26 | End: 2021-06-01

## 2021-05-31 DIAGNOSIS — M06.9 RHEUMATOID ARTHRITIS, INVOLVING UNSPECIFIED SITE, UNSPECIFIED WHETHER RHEUMATOID FACTOR PRESENT (HCC): ICD-10-CM

## 2021-06-01 RX ORDER — MELOXICAM 15 MG/1
TABLET ORAL
Qty: 30 TABLET | Refills: 0 | Status: SHIPPED | OUTPATIENT
Start: 2021-06-01 | End: 2022-06-21 | Stop reason: ALTCHOICE

## 2021-06-27 DIAGNOSIS — M19.90 ARTHRITIS: ICD-10-CM

## 2021-06-27 DIAGNOSIS — M05.79 RHEUMATOID ARTHRITIS INVOLVING MULTIPLE SITES WITH POSITIVE RHEUMATOID FACTOR (HCC): ICD-10-CM

## 2021-07-01 ENCOUNTER — TELEPHONE (OUTPATIENT)
Dept: OBGYN CLINIC | Facility: HOSPITAL | Age: 56
End: 2021-07-01

## 2021-07-06 ENCOUNTER — TELEPHONE (OUTPATIENT)
Dept: RHEUMATOLOGY | Facility: CLINIC | Age: 56
End: 2021-07-06

## 2021-07-06 DIAGNOSIS — M05.79 RHEUMATOID ARTHRITIS INVOLVING MULTIPLE SITES WITH POSITIVE RHEUMATOID FACTOR (HCC): ICD-10-CM

## 2021-07-06 DIAGNOSIS — M19.90 ARTHRITIS: ICD-10-CM

## 2021-07-06 RX ORDER — FOLIC ACID 1 MG/1
1 TABLET ORAL
Qty: 90 TABLET | Refills: 3 | Status: SHIPPED | OUTPATIENT
Start: 2021-07-06 | End: 2021-07-13

## 2021-07-06 RX ORDER — METHOTREXATE 2.5 MG/1
TABLET ORAL
Qty: 18 TABLET | Refills: 5 | Status: SHIPPED | OUTPATIENT
Start: 2021-07-06 | End: 2021-07-07 | Stop reason: SDUPTHER

## 2021-07-06 NOTE — TELEPHONE ENCOUNTER
Called left message that patient needs to schedule a follow-up visit with Johan Leon in order to get refills of medications  Last seen in December

## 2021-07-06 NOTE — TELEPHONE ENCOUNTER
Abelonluci Soliman , wife calling in to see if you can just send a refill for just today  Methotrexate 2 5mg    I offered appointment today but he needs blood work so I scheduled him for next tuesday the 13th [address]    C/b # [phone]

## 2021-07-07 ENCOUNTER — TELEPHONE (OUTPATIENT)
Dept: OBGYN CLINIC | Facility: HOSPITAL | Age: 56
End: 2021-07-07

## 2021-07-07 DIAGNOSIS — M05.79 RHEUMATOID ARTHRITIS INVOLVING MULTIPLE SITES WITH POSITIVE RHEUMATOID FACTOR (HCC): ICD-10-CM

## 2021-07-07 DIAGNOSIS — M06.9 RHEUMATOID ARTHRITIS, INVOLVING UNSPECIFIED SITE, UNSPECIFIED WHETHER RHEUMATOID FACTOR PRESENT (HCC): ICD-10-CM

## 2021-07-07 DIAGNOSIS — M19.90 ARTHRITIS: ICD-10-CM

## 2021-07-07 RX ORDER — METHOTREXATE 2.5 MG/1
TABLET ORAL
Qty: 18 TABLET | Refills: 5 | Status: SHIPPED | OUTPATIENT
Start: 2021-07-07 | End: 2021-07-13

## 2021-07-07 NOTE — TELEPHONE ENCOUNTER
Patient sees Dr Sravanthi Treadwell  Patients wife is calling to advice that the pharmacy stated they received the prescription then a cancellation  Please resend methotrexate to [facility] on file          CB: [phone]

## 2021-07-11 DIAGNOSIS — M05.79 RHEUMATOID ARTHRITIS INVOLVING MULTIPLE SITES WITH POSITIVE RHEUMATOID FACTOR (HCC): ICD-10-CM

## 2021-07-12 RX ORDER — OMEPRAZOLE 20 MG/1
CAPSULE, DELAYED RELEASE ORAL
Qty: 90 CAPSULE | Refills: 1 | Status: SHIPPED | OUTPATIENT
Start: 2021-07-12 | End: 2021-10-20 | Stop reason: SDUPTHER

## 2021-07-13 ENCOUNTER — OFFICE VISIT (OUTPATIENT)
Dept: RHEUMATOLOGY | Facility: CLINIC | Age: 56
End: 2021-07-13
Payer: COMMERCIAL

## 2021-07-13 ENCOUNTER — APPOINTMENT (OUTPATIENT)
Dept: LAB | Facility: HOSPITAL | Age: 56
End: 2021-07-13
Payer: COMMERCIAL

## 2021-07-13 VITALS
SYSTOLIC BLOOD PRESSURE: 115 MMHG | BODY MASS INDEX: 21.19 KG/M2 | WEIGHT: 148 LBS | DIASTOLIC BLOOD PRESSURE: 65 MMHG | HEART RATE: 100 BPM | HEIGHT: 70 IN

## 2021-07-13 DIAGNOSIS — M05.79 RHEUMATOID ARTHRITIS INVOLVING MULTIPLE SITES WITH POSITIVE RHEUMATOID FACTOR (HCC): ICD-10-CM

## 2021-07-13 DIAGNOSIS — M19.90 ARTHRITIS: ICD-10-CM

## 2021-07-13 DIAGNOSIS — M06.09 RHEUMATOID ARTHRITIS OF MULTIPLE SITES WITH NEGATIVE RHEUMATOID FACTOR (HCC): ICD-10-CM

## 2021-07-13 DIAGNOSIS — Z51.81 MEDICATION MONITORING ENCOUNTER: Primary | ICD-10-CM

## 2021-07-13 LAB
ALBUMIN SERPL BCP-MCNC: 4 G/DL (ref 3.5–5)
ALP SERPL-CCNC: 71 U/L (ref 46–116)
ALT SERPL W P-5'-P-CCNC: 24 U/L (ref 12–78)
ANION GAP SERPL CALCULATED.3IONS-SCNC: 6 MMOL/L (ref 4–13)
AST SERPL W P-5'-P-CCNC: 21 U/L (ref 5–45)
BASOPHILS # BLD AUTO: 0.02 THOUSANDS/ΜL (ref 0–0.1)
BASOPHILS NFR BLD AUTO: 0 % (ref 0–1)
BILIRUB SERPL-MCNC: 0.43 MG/DL (ref 0.2–1)
BUN SERPL-MCNC: 19 MG/DL (ref 5–25)
CALCIUM SERPL-MCNC: 10 MG/DL (ref 8.3–10.1)
CHLORIDE SERPL-SCNC: 102 MMOL/L (ref 100–108)
CO2 SERPL-SCNC: 32 MMOL/L (ref 21–32)
CREAT SERPL-MCNC: 1.09 MG/DL (ref 0.6–1.3)
CRP SERPL QL: 3.1 MG/L
EOSINOPHIL # BLD AUTO: 0.32 THOUSAND/ΜL (ref 0–0.61)
EOSINOPHIL NFR BLD AUTO: 6 % (ref 0–6)
ERYTHROCYTE [DISTWIDTH] IN BLOOD BY AUTOMATED COUNT: 13.9 % (ref 11.6–15.1)
ERYTHROCYTE [SEDIMENTATION RATE] IN BLOOD: 12 MM/HOUR (ref 0–19)
GFR SERPL CREATININE-BSD FRML MDRD: 76 ML/MIN/1.73SQ M
GLUCOSE SERPL-MCNC: 119 MG/DL (ref 65–140)
HBV SURFACE AG SER QL: NORMAL
HCT VFR BLD AUTO: 46.9 % (ref 36.5–49.3)
HCV AB SER QL: NORMAL
HGB BLD-MCNC: 15.2 G/DL (ref 12–17)
IMM GRANULOCYTES # BLD AUTO: 0.01 THOUSAND/UL (ref 0–0.2)
IMM GRANULOCYTES NFR BLD AUTO: 0 % (ref 0–2)
LYMPHOCYTES # BLD AUTO: 1.88 THOUSANDS/ΜL (ref 0.6–4.47)
LYMPHOCYTES NFR BLD AUTO: 36 % (ref 14–44)
MCH RBC QN AUTO: 32.1 PG (ref 26.8–34.3)
MCHC RBC AUTO-ENTMCNC: 32.4 G/DL (ref 31.4–37.4)
MCV RBC AUTO: 99 FL (ref 82–98)
MONOCYTES # BLD AUTO: 0.75 THOUSAND/ΜL (ref 0.17–1.22)
MONOCYTES NFR BLD AUTO: 14 % (ref 4–12)
NEUTROPHILS # BLD AUTO: 2.23 THOUSANDS/ΜL (ref 1.85–7.62)
NEUTS SEG NFR BLD AUTO: 44 % (ref 43–75)
NRBC BLD AUTO-RTO: 0 /100 WBCS
PLATELET # BLD AUTO: 210 THOUSANDS/UL (ref 149–390)
PMV BLD AUTO: 11 FL (ref 8.9–12.7)
POTASSIUM SERPL-SCNC: 5.2 MMOL/L (ref 3.5–5.3)
PROT SERPL-MCNC: 7.6 G/DL (ref 6.4–8.2)
RBC # BLD AUTO: 4.74 MILLION/UL (ref 3.88–5.62)
SODIUM SERPL-SCNC: 140 MMOL/L (ref 136–145)
WBC # BLD AUTO: 5.21 THOUSAND/UL (ref 4.31–10.16)

## 2021-07-13 PROCEDURE — 86200 CCP ANTIBODY: CPT

## 2021-07-13 PROCEDURE — 99213 OFFICE O/P EST LOW 20 MIN: CPT | Performed by: INTERNAL MEDICINE

## 2021-07-13 PROCEDURE — 86803 HEPATITIS C AB TEST: CPT

## 2021-07-13 PROCEDURE — 86140 C-REACTIVE PROTEIN: CPT

## 2021-07-13 PROCEDURE — 87340 HEPATITIS B SURFACE AG IA: CPT

## 2021-07-13 PROCEDURE — 85025 COMPLETE CBC W/AUTO DIFF WBC: CPT

## 2021-07-13 PROCEDURE — 3008F BODY MASS INDEX DOCD: CPT | Performed by: INTERNAL MEDICINE

## 2021-07-13 PROCEDURE — 86480 TB TEST CELL IMMUN MEASURE: CPT

## 2021-07-13 PROCEDURE — 85652 RBC SED RATE AUTOMATED: CPT

## 2021-07-13 PROCEDURE — 80053 COMPREHEN METABOLIC PANEL: CPT

## 2021-07-13 PROCEDURE — 4004F PT TOBACCO SCREEN RCVD TLK: CPT | Performed by: INTERNAL MEDICINE

## 2021-07-13 PROCEDURE — 36415 COLL VENOUS BLD VENIPUNCTURE: CPT

## 2021-07-13 RX ORDER — METHOTREXATE 2.5 MG/1
TABLET ORAL
Qty: 60 TABLET | Refills: 1 | Status: SHIPPED | OUTPATIENT
Start: 2021-07-13 | End: 2021-09-04 | Stop reason: SDUPTHER

## 2021-07-13 RX ORDER — FOLIC ACID 1 MG/1
2 TABLET ORAL
Qty: 180 TABLET | Refills: 2 | Status: SHIPPED | OUTPATIENT
Start: 2021-07-13 | End: 2021-09-07 | Stop reason: SDUPTHER

## 2021-07-13 RX ORDER — METHYLPREDNISOLONE 8 MG/1
TABLET ORAL
Qty: 56 TABLET | Refills: 3 | Status: SHIPPED | OUTPATIENT
Start: 2021-07-13 | End: 2021-08-10

## 2021-07-13 NOTE — PATIENT INSTRUCTIONS
Take 5 methotrexate once a week  I changed the prescription and gave you a 90 day supply of the medication with a refill  That'll last you until next visit  Please have the blood work checked at least 2 weeks before your follow up appointments with me  Also, take 2 folic acid every night  That'll help prevent the hair loss

## 2021-07-13 NOTE — PROGRESS NOTES
Assessment and Plan:   Seropositive RA--continue MTX and folic acid  Increase MTX to 12 5 mg weekly and increase folic acid to 2mg daily as patient c/o some hair loss  Counseled on importance of compliance with quarterly laboratory evaluations  He understands  Continue diet/exercise  NSAIDs PRN  Anti inflammatory diet  F/u in 4 months  Rheumatic Disease Summary  As above    Here for f/u visit  Taking methotrexate and folic acid  All labs reviewed in detail with patient and patient understands  He feels much improved  He c/o some hair loss  No other side effects  Much less pain/swelling and stiffness of his joints  The following portions of the patient's history were reviewed and updated as appropriate: allergies, current medications, past family history, past medical history, past social history, past surgical history and problem list     Review of Systems:   Review of Systems   Constitutional: Negative for chills, fatigue, fever and unexpected weight change  HENT: Negative for mouth sores and trouble swallowing  Eyes: Negative for pain and visual disturbance  Respiratory: Negative for cough and shortness of breath  Cardiovascular: Negative for chest pain and leg swelling  Gastrointestinal: Negative for constipation and diarrhea  Musculoskeletal: Positive for arthralgias  Negative for back pain, joint swelling and myalgias  Skin: Negative for color change and rash  Neurological: Negative for weakness  Hematological: Negative for adenopathy  Psychiatric/Behavioral: Negative for sleep disturbance         Home Medications:    Current Outpatient Medications:     folic acid (FOLVITE) 1 mg tablet, Take 1 tablet (1 mg total) by mouth daily at bedtime, Disp: 90 tablet, Rfl: 3    meloxicam (MOBIC) 15 mg tablet, TAKE 1 TABLET(15 MG) BY MOUTH DAILY, Disp: 30 tablet, Rfl: 0    methotrexate 2 5 MG tablet, TAKE 4 TABLETS BY MOUTH 1 TIME A WEEK WITH BREAKFAST, Disp: 18 tablet, Rfl: 5    omeprazole (PriLOSEC) 20 mg delayed release capsule, TAKE 1 CAPSULE(20 MG) BY MOUTH DAILY, Disp: 90 capsule, Rfl: 1    traMADol (ULTRAM) 50 mg tablet, Take 1 tablet (50 mg total) by mouth every 8 (eight) hours as needed for moderate pain, Disp: 30 tablet, Rfl: 0    Objective: There were no vitals filed for this visit  Physical Exam  Constitutional:       General: He is not in acute distress  Appearance: He is well-developed  HENT:      Head: Normocephalic and atraumatic  Eyes:      General: Lids are normal  No scleral icterus  Conjunctiva/sclera: Conjunctivae normal    Cardiovascular:      Rate and Rhythm: Normal rate and regular rhythm  Heart sounds: S1 normal and S2 normal  No murmur heard  No friction rub  Pulmonary:      Effort: Pulmonary effort is normal  No tachypnea or respiratory distress  Breath sounds: Normal breath sounds  No wheezing, rhonchi or rales  Musculoskeletal:      Cervical back: Neck supple  No muscular tenderness  Skin:     General: Skin is warm and dry  Findings: No rash  Nails: There is no clubbing  Neurological:      Mental Status: He is alert  Sensory: No sensory deficit  Psychiatric:         Behavior: Behavior normal  Behavior is cooperative  Reviewed labs and imaging  Imaging:   No results found      Labs:   Appointment on 07/13/2021   Component Date Value Ref Range Status    CRP 07/13/2021 3 1* <3 0 mg/L Final    WBC 07/13/2021 5 21  4 31 - 10 16 Thousand/uL Final    RBC 07/13/2021 4 74  3 88 - 5 62 Million/uL Final    Hemoglobin 07/13/2021 15 2  12 0 - 17 0 g/dL Final    Hematocrit 07/13/2021 46 9  36 5 - 49 3 % Final    MCV 07/13/2021 99* 82 - 98 fL Final    MCH 07/13/2021 32 1  26 8 - 34 3 pg Final    MCHC 07/13/2021 32 4  31 4 - 37 4 g/dL Final    RDW 07/13/2021 13 9  11 6 - 15 1 % Final    MPV 07/13/2021 11 0  8 9 - 12 7 fL Final    Platelets 15/89/3469 210  149 - 390 Thousands/uL Final    nRBC 07/13/2021 0  /100 WBCs Final    Neutrophils Relative 07/13/2021 44  43 - 75 % Final    Immat GRANS % 07/13/2021 0  0 - 2 % Final    Lymphocytes Relative 07/13/2021 36  14 - 44 % Final    Monocytes Relative 07/13/2021 14* 4 - 12 % Final    Eosinophils Relative 07/13/2021 6  0 - 6 % Final    Basophils Relative 07/13/2021 0  0 - 1 % Final    Neutrophils Absolute 07/13/2021 2 23  1 85 - 7 62 Thousands/µL Final    Immature Grans Absolute 07/13/2021 0 01  0 00 - 0 20 Thousand/uL Final    Lymphocytes Absolute 07/13/2021 1 88  0 60 - 4 47 Thousands/µL Final    Monocytes Absolute 07/13/2021 0 75  0 17 - 1 22 Thousand/µL Final    Eosinophils Absolute 07/13/2021 0 32  0 00 - 0 61 Thousand/µL Final    Basophils Absolute 07/13/2021 0 02  0 00 - 0 10 Thousands/µL Final    Sed Rate 07/13/2021 12  0 - 19 mm/hour Final    Sodium 07/13/2021 140  136 - 145 mmol/L Final    Potassium 07/13/2021 5 2  3 5 - 5 3 mmol/L Final    Chloride 07/13/2021 102  100 - 108 mmol/L Final    CO2 07/13/2021 32  21 - 32 mmol/L Final    ANION GAP 07/13/2021 6  4 - 13 mmol/L Final    BUN 07/13/2021 19  5 - 25 mg/dL Final    Creatinine 07/13/2021 1 09  0 60 - 1 30 mg/dL Final    Standardized to IDMS reference method    Glucose 07/13/2021 119  65 - 140 mg/dL Final    If the patient is fasting, the ADA then defines impaired fasting glucose as > 100 mg/dL and diabetes as > or equal to 123 mg/dL  Specimen collection should occur prior to Sulfasalazine administration due to the potential for falsely depressed results  Specimen collection should occur prior to Sulfapyridine administration due to the potential for falsely elevated results   Calcium 07/13/2021 10 0  8 3 - 10 1 mg/dL Final    AST 07/13/2021 21  5 - 45 U/L Final    Specimen collection should occur prior to Sulfasalazine administration due to the potential for falsely depressed results       ALT 07/13/2021 24  12 - 78 U/L Final    Specimen collection should occur prior to Sulfasalazine administration due to the potential for falsely depressed results   Alkaline Phosphatase 07/13/2021 71  46 - 116 U/L Final    Total Protein 07/13/2021 7 6  6 4 - 8 2 g/dL Final    Albumin 07/13/2021 4 0  3 5 - 5 0 g/dL Final    Total Bilirubin 07/13/2021 0 43  0 20 - 1 00 mg/dL Final    Use of this assay is not recommended for patients undergoing treatment with eltrombopag due to the potential for falsely elevated results      eGFR 07/13/2021 76  ml/min/1 73sq m Final   Office Visit on 12/01/2020   Component Date Value Ref Range Status    Ventricular Rate 12/01/2020 81  BPM Final    Atrial Rate 12/01/2020 81  BPM Final    NM Interval 12/01/2020 124  ms Final    QRSD Interval 12/01/2020 90  ms Final    QT Interval 12/01/2020 356  ms Final    QTC Interval 12/01/2020 413  ms Final    P Axis 12/01/2020 62  degrees Final    QRS Axis 12/01/2020 74  degrees Final    T Wave Axis 12/01/2020 57  degrees Final   Lab on 12/01/2020   Component Date Value Ref Range Status    WBC 12/01/2020 11 88* 4 31 - 10 16 Thousand/uL Final    RBC 12/01/2020 4 66  3 88 - 5 62 Million/uL Final    Hemoglobin 12/01/2020 14 1  12 0 - 17 0 g/dL Final    Hematocrit 12/01/2020 44 1  36 5 - 49 3 % Final    MCV 12/01/2020 95  82 - 98 fL Final    MCH 12/01/2020 30 3  26 8 - 34 3 pg Final    MCHC 12/01/2020 32 0  31 4 - 37 4 g/dL Final    RDW 12/01/2020 14 1  11 6 - 15 1 % Final    MPV 12/01/2020 10 3  8 9 - 12 7 fL Final    Platelets 49/97/8474 305  149 - 390 Thousands/uL Final    nRBC 12/01/2020 0  /100 WBCs Final    Neutrophils Relative 12/01/2020 66  43 - 75 % Final    Immat GRANS % 12/01/2020 0  0 - 2 % Final    Lymphocytes Relative 12/01/2020 27  14 - 44 % Final    Monocytes Relative 12/01/2020 6  4 - 12 % Final    Eosinophils Relative 12/01/2020 1  0 - 6 % Final    Basophils Relative 12/01/2020 0  0 - 1 % Final    Neutrophils Absolute 12/01/2020 7 78* 1 85 - 7 62 Thousands/µL Final    Immature Grans Absolute 12/01/2020 0 03  0 00 - 0 20 Thousand/uL Final    Lymphocytes Absolute 12/01/2020 3 18  0 60 - 4 47 Thousands/µL Final    Monocytes Absolute 12/01/2020 0 76  0 17 - 1 22 Thousand/µL Final    Eosinophils Absolute 12/01/2020 0 11  0 00 - 0 61 Thousand/µL Final    Basophils Absolute 12/01/2020 0 02  0 00 - 0 10 Thousands/µL Final   Lab on 11/12/2020   Component Date Value Ref Range Status    ds DNA Ab 11/12/2020 1  0 - 9 IU/mL Final                                       Negative      <5                                     Equivocal  5 - 9                                     Positive      >9    YOLY 11/12/2020 Negative  Negative Final    Total CK 11/12/2020 57  39 - 308 U/L Final    TSH 3RD GENERATON 11/12/2020 0 665  0 358 - 3 740 uIU/mL Final    Using supplements with high doses of biotin 20 to more than 300 times greater than the adequate daily intake for adults of 30 mcg/day as established by the [place] [employer], can cause falsely depress results   Uric Acid 11/12/2020 5 6  4 2 - 8 0 mg/dL Final    Specimen collection should occur prior to Metamizole administration due to the potential for falsely depressed results   Sodium 11/12/2020 141  136 - 145 mmol/L Final    Potassium 11/12/2020 4 2  3 5 - 5 3 mmol/L Final    Chloride 11/12/2020 107  100 - 108 mmol/L Final    CO2 11/12/2020 31  21 - 32 mmol/L Final    ANION GAP 11/12/2020 3* 4 - 13 mmol/L Final    BUN 11/12/2020 13  5 - 25 mg/dL Final    Creatinine 11/12/2020 0 81  0 60 - 1 30 mg/dL Final    Standardized to IDMS reference method    Glucose, Fasting 11/12/2020 120* 65 - 99 mg/dL Final    Specimen collection should occur prior to Sulfasalazine administration due to the potential for falsely depressed results  Specimen collection should occur prior to Sulfapyridine administration due to the potential for falsely elevated results      Calcium 11/12/2020 10 2* 8 3 - 10 1 mg/dL Final    eGFR 11/12/2020 101  ml/min/1 73sq m Final  Lyme total antibody 11/12/2020 18  0 00 - 119 Final    Negative (0-119) Absence of detectable Borrelia burgdoferi Antibodies  A negative result does not exclude the possibility of Borrelia infection  If early Lyme disease is suspected,a second sample should be collected & tested 4 weeks after initial testing       Sed Rate 11/12/2020 121* 0 - 19 mm/hour Final    Rheumatoid Factor 11/12/2020 Positive* Negative Final    See RF Quantitation    RF Quantitation 11/12/2020 20 IU/mL* (none) Final

## 2021-07-16 LAB
CCP AB SER IA-ACNC: 1
GAMMA INTERFERON BACKGROUND BLD IA-ACNC: 0.03 IU/ML
M TB IFN-G BLD-IMP: NEGATIVE
M TB IFN-G CD4+ BCKGRND COR BLD-ACNC: 0 IU/ML
M TB IFN-G CD4+ BCKGRND COR BLD-ACNC: 0 IU/ML
MITOGEN IGNF BCKGRD COR BLD-ACNC: 3.25 IU/ML

## 2021-09-04 ENCOUNTER — NURSE TRIAGE (OUTPATIENT)
Dept: OTHER | Facility: OTHER | Age: 56
End: 2021-09-04

## 2021-09-04 DIAGNOSIS — M05.79 RHEUMATOID ARTHRITIS INVOLVING MULTIPLE SITES WITH POSITIVE RHEUMATOID FACTOR (HCC): ICD-10-CM

## 2021-09-04 DIAGNOSIS — M19.90 ARTHRITIS: ICD-10-CM

## 2021-09-04 RX ORDER — METHYLPREDNISOLONE 8 MG/1
TABLET ORAL
Qty: 56 TABLET | Refills: 0 | Status: SHIPPED | OUTPATIENT
Start: 2021-09-04 | End: 2021-09-04 | Stop reason: SDUPTHER

## 2021-09-04 RX ORDER — METHOTREXATE 2.5 MG/1
TABLET ORAL
Qty: 60 TABLET | Refills: 0 | Status: SHIPPED | OUTPATIENT
Start: 2021-09-04 | End: 2021-09-07 | Stop reason: SDUPTHER

## 2021-09-04 RX ORDER — METHYLPREDNISOLONE 8 MG/1
TABLET ORAL
Qty: 56 TABLET | Refills: 0 | Status: SHIPPED | OUTPATIENT
Start: 2021-09-04 | End: 2021-09-07 | Stop reason: SDUPTHER

## 2021-09-04 NOTE — TELEPHONE ENCOUNTER
On call confirmed okay to re-order medrol 8 mg pack as well as methotrexate 2 5 mg  Patients original pharmacy is shut down  Notified patient  Reason for Disposition   [1] Caller has URGENT medicine question about med that PCP or specialist prescribed AND [2] triager unable to answer question    Answer Assessment - Initial Assessment Questions  1  NAME of MEDICATION: "What medicine are you calling about?"      Methotrexate and methylprednisolone     2  QUESTION: "What is your question?" (e g , medication refill, side effect)      They are at a pharmacy that is no longer open  3  PRESCRIBING HCP: "Who prescribed it?" Reason: if prescribed by specialist, call should be referred to that group  Rheumatology     4  SYMPTOMS: "Do you have any symptoms?"      Out of meds     5   SEVERITY: If symptoms are present, ask "Are they mild, moderate or severe?"      N/A    Protocols used: MEDICATION QUESTION CALL-ADULT-

## 2021-09-07 DIAGNOSIS — M05.79 RHEUMATOID ARTHRITIS INVOLVING MULTIPLE SITES WITH POSITIVE RHEUMATOID FACTOR (HCC): ICD-10-CM

## 2021-09-07 DIAGNOSIS — M19.90 ARTHRITIS: ICD-10-CM

## 2021-09-07 RX ORDER — FOLIC ACID 1 MG/1
2 TABLET ORAL
Qty: 180 TABLET | Refills: 2 | Status: SHIPPED | OUTPATIENT
Start: 2021-09-07 | End: 2021-10-20 | Stop reason: SDUPTHER

## 2021-09-07 RX ORDER — METHOTREXATE 2.5 MG/1
TABLET ORAL
Qty: 60 TABLET | Refills: 0 | Status: SHIPPED | OUTPATIENT
Start: 2021-09-07 | End: 2021-10-20 | Stop reason: SDUPTHER

## 2021-09-07 RX ORDER — METHYLPREDNISOLONE 8 MG/1
TABLET ORAL
Qty: 56 TABLET | Refills: 0 | Status: SHIPPED | OUTPATIENT
Start: 2021-09-07 | End: 2021-10-20 | Stop reason: SDUPTHER

## 2021-10-20 DIAGNOSIS — M05.79 RHEUMATOID ARTHRITIS INVOLVING MULTIPLE SITES WITH POSITIVE RHEUMATOID FACTOR (HCC): ICD-10-CM

## 2021-10-20 DIAGNOSIS — M19.90 ARTHRITIS: ICD-10-CM

## 2021-10-20 RX ORDER — METHOTREXATE 2.5 MG/1
TABLET ORAL
Qty: 60 TABLET | Refills: 0 | Status: SHIPPED | OUTPATIENT
Start: 2021-10-20 | End: 2021-11-09

## 2021-10-20 RX ORDER — METHYLPREDNISOLONE 8 MG/1
TABLET ORAL
Qty: 56 TABLET | Refills: 0 | Status: SHIPPED | OUTPATIENT
Start: 2021-10-20 | End: 2021-11-02

## 2021-10-20 RX ORDER — OMEPRAZOLE 20 MG/1
20 CAPSULE, DELAYED RELEASE ORAL DAILY
Qty: 90 CAPSULE | Refills: 0 | Status: SHIPPED | OUTPATIENT
Start: 2021-10-20 | End: 2022-03-28

## 2021-10-20 RX ORDER — FOLIC ACID 1 MG/1
2 TABLET ORAL
Qty: 180 TABLET | Refills: 0 | Status: SHIPPED | OUTPATIENT
Start: 2021-10-20 | End: 2021-11-09

## 2021-10-30 ENCOUNTER — APPOINTMENT (OUTPATIENT)
Dept: LAB | Facility: HOSPITAL | Age: 56
End: 2021-10-30
Payer: COMMERCIAL

## 2021-10-30 LAB
ALBUMIN SERPL BCP-MCNC: 3.7 G/DL (ref 3.5–5)
ALP SERPL-CCNC: 61 U/L (ref 46–116)
ALT SERPL W P-5'-P-CCNC: 33 U/L (ref 12–78)
ANION GAP SERPL CALCULATED.3IONS-SCNC: 6 MMOL/L (ref 4–13)
AST SERPL W P-5'-P-CCNC: 23 U/L (ref 5–45)
BASOPHILS # BLD AUTO: 0.03 THOUSANDS/ΜL (ref 0–0.1)
BASOPHILS NFR BLD AUTO: 0 % (ref 0–1)
BILIRUB SERPL-MCNC: 0.51 MG/DL (ref 0.2–1)
BUN SERPL-MCNC: 13 MG/DL (ref 5–25)
CALCIUM SERPL-MCNC: 9.6 MG/DL (ref 8.3–10.1)
CHLORIDE SERPL-SCNC: 102 MMOL/L (ref 100–108)
CO2 SERPL-SCNC: 35 MMOL/L (ref 21–32)
CREAT SERPL-MCNC: 1.17 MG/DL (ref 0.6–1.3)
CRP SERPL QL: 6 MG/L
EOSINOPHIL # BLD AUTO: 0.14 THOUSAND/ΜL (ref 0–0.61)
EOSINOPHIL NFR BLD AUTO: 1 % (ref 0–6)
ERYTHROCYTE [DISTWIDTH] IN BLOOD BY AUTOMATED COUNT: 13.6 % (ref 11.6–15.1)
GFR SERPL CREATININE-BSD FRML MDRD: 70 ML/MIN/1.73SQ M
GLUCOSE P FAST SERPL-MCNC: 116 MG/DL (ref 65–99)
HCT VFR BLD AUTO: 48.4 % (ref 36.5–49.3)
HGB BLD-MCNC: 15.7 G/DL (ref 12–17)
IMM GRANULOCYTES # BLD AUTO: 0.06 THOUSAND/UL (ref 0–0.2)
IMM GRANULOCYTES NFR BLD AUTO: 1 % (ref 0–2)
LYMPHOCYTES # BLD AUTO: 2.54 THOUSANDS/ΜL (ref 0.6–4.47)
LYMPHOCYTES NFR BLD AUTO: 24 % (ref 14–44)
MCH RBC QN AUTO: 32.7 PG (ref 26.8–34.3)
MCHC RBC AUTO-ENTMCNC: 32.4 G/DL (ref 31.4–37.4)
MCV RBC AUTO: 101 FL (ref 82–98)
MONOCYTES # BLD AUTO: 0.95 THOUSAND/ΜL (ref 0.17–1.22)
MONOCYTES NFR BLD AUTO: 9 % (ref 4–12)
NEUTROPHILS # BLD AUTO: 6.77 THOUSANDS/ΜL (ref 1.85–7.62)
NEUTS SEG NFR BLD AUTO: 65 % (ref 43–75)
NRBC BLD AUTO-RTO: 0 /100 WBCS
PLATELET # BLD AUTO: 248 THOUSANDS/UL (ref 149–390)
PMV BLD AUTO: 10.4 FL (ref 8.9–12.7)
POTASSIUM SERPL-SCNC: 5.2 MMOL/L (ref 3.5–5.3)
PROT SERPL-MCNC: 7 G/DL (ref 6.4–8.2)
RBC # BLD AUTO: 4.8 MILLION/UL (ref 3.88–5.62)
SODIUM SERPL-SCNC: 143 MMOL/L (ref 136–145)
WBC # BLD AUTO: 10.49 THOUSAND/UL (ref 4.31–10.16)

## 2021-10-30 PROCEDURE — 85025 COMPLETE CBC W/AUTO DIFF WBC: CPT | Performed by: INTERNAL MEDICINE

## 2021-10-30 PROCEDURE — 36415 COLL VENOUS BLD VENIPUNCTURE: CPT | Performed by: INTERNAL MEDICINE

## 2021-10-30 PROCEDURE — 86140 C-REACTIVE PROTEIN: CPT | Performed by: INTERNAL MEDICINE

## 2021-10-30 PROCEDURE — 80053 COMPREHEN METABOLIC PANEL: CPT | Performed by: INTERNAL MEDICINE

## 2021-11-02 DIAGNOSIS — M05.79 RHEUMATOID ARTHRITIS INVOLVING MULTIPLE SITES WITH POSITIVE RHEUMATOID FACTOR (HCC): ICD-10-CM

## 2021-11-02 RX ORDER — METHYLPREDNISOLONE 8 MG/1
TABLET ORAL
Qty: 56 TABLET | Refills: 0 | Status: SHIPPED | OUTPATIENT
Start: 2021-11-02 | End: 2021-11-09

## 2021-11-09 ENCOUNTER — OFFICE VISIT (OUTPATIENT)
Dept: RHEUMATOLOGY | Facility: CLINIC | Age: 56
End: 2021-11-09
Payer: COMMERCIAL

## 2021-11-09 VITALS — HEIGHT: 70 IN | WEIGHT: 150 LBS | BODY MASS INDEX: 21.47 KG/M2

## 2021-11-09 DIAGNOSIS — M19.90 ARTHRITIS: ICD-10-CM

## 2021-11-09 DIAGNOSIS — M05.79 RHEUMATOID ARTHRITIS INVOLVING MULTIPLE SITES WITH POSITIVE RHEUMATOID FACTOR (HCC): ICD-10-CM

## 2021-11-09 PROCEDURE — 99213 OFFICE O/P EST LOW 20 MIN: CPT | Performed by: INTERNAL MEDICINE

## 2021-11-09 RX ORDER — METHOTREXATE 2.5 MG/1
TABLET ORAL
Qty: 60 TABLET | Refills: 0 | Status: SHIPPED | OUTPATIENT
Start: 2021-11-09 | End: 2021-12-15

## 2021-11-09 RX ORDER — FOLIC ACID 1 MG/1
2 TABLET ORAL
Qty: 180 TABLET | Refills: 0 | Status: SHIPPED | OUTPATIENT
Start: 2021-11-09 | End: 2021-11-09

## 2021-11-09 RX ORDER — METHOTREXATE 2.5 MG/1
TABLET ORAL
Qty: 60 TABLET | Refills: 0 | Status: SHIPPED | OUTPATIENT
Start: 2021-11-09 | End: 2021-11-09

## 2021-11-09 RX ORDER — METHYLPREDNISOLONE 4 MG/1
TABLET ORAL
Qty: 90 TABLET | Refills: 1 | Status: SHIPPED | OUTPATIENT
Start: 2021-11-09 | End: 2022-02-15 | Stop reason: SDUPTHER

## 2021-11-09 RX ORDER — FOLIC ACID 1 MG/1
2 TABLET ORAL
Qty: 180 TABLET | Refills: 1 | Status: SHIPPED | OUTPATIENT
Start: 2021-11-09 | End: 2021-12-15 | Stop reason: SDUPTHER

## 2021-12-15 ENCOUNTER — TELEPHONE (OUTPATIENT)
Dept: OBGYN CLINIC | Facility: MEDICAL CENTER | Age: 56
End: 2021-12-15

## 2021-12-15 DIAGNOSIS — M19.90 ARTHRITIS: ICD-10-CM

## 2021-12-15 DIAGNOSIS — M05.79 RHEUMATOID ARTHRITIS INVOLVING MULTIPLE SITES WITH POSITIVE RHEUMATOID FACTOR (HCC): ICD-10-CM

## 2021-12-15 RX ORDER — METHOTREXATE 2.5 MG/1
TABLET ORAL
Qty: 60 TABLET | Refills: 1 | Status: SHIPPED | OUTPATIENT
Start: 2021-12-15 | End: 2022-02-15 | Stop reason: SDUPTHER

## 2021-12-15 RX ORDER — FOLIC ACID 1 MG/1
2 TABLET ORAL
Qty: 180 TABLET | Refills: 1 | Status: SHIPPED | OUTPATIENT
Start: 2021-12-15 | End: 2022-02-15 | Stop reason: SDUPTHER

## 2021-12-27 ENCOUNTER — TELEPHONE (OUTPATIENT)
Dept: OBGYN CLINIC | Facility: HOSPITAL | Age: 56
End: 2021-12-27

## 2022-02-08 ENCOUNTER — APPOINTMENT (OUTPATIENT)
Dept: LAB | Facility: HOSPITAL | Age: 57
End: 2022-02-08
Payer: COMMERCIAL

## 2022-02-08 LAB
BASOPHILS # BLD AUTO: 0.01 THOUSANDS/ΜL (ref 0–0.1)
BASOPHILS NFR BLD AUTO: 0 % (ref 0–1)
CRP SERPL QL: <3 MG/L
EOSINOPHIL # BLD AUTO: 0.06 THOUSAND/ΜL (ref 0–0.61)
EOSINOPHIL NFR BLD AUTO: 1 % (ref 0–6)
ERYTHROCYTE [DISTWIDTH] IN BLOOD BY AUTOMATED COUNT: 14 % (ref 11.6–15.1)
ERYTHROCYTE [SEDIMENTATION RATE] IN BLOOD: 6 MM/HOUR (ref 0–19)
HCT VFR BLD AUTO: 43.3 % (ref 36.5–49.3)
HGB BLD-MCNC: 14.1 G/DL (ref 12–17)
IMM GRANULOCYTES # BLD AUTO: 0.01 THOUSAND/UL (ref 0–0.2)
IMM GRANULOCYTES NFR BLD AUTO: 0 % (ref 0–2)
LYMPHOCYTES # BLD AUTO: 1.48 THOUSANDS/ΜL (ref 0.6–4.47)
LYMPHOCYTES NFR BLD AUTO: 25 % (ref 14–44)
MCH RBC QN AUTO: 32.3 PG (ref 26.8–34.3)
MCHC RBC AUTO-ENTMCNC: 32.6 G/DL (ref 31.4–37.4)
MCV RBC AUTO: 99 FL (ref 82–98)
MONOCYTES # BLD AUTO: 0.43 THOUSAND/ΜL (ref 0.17–1.22)
MONOCYTES NFR BLD AUTO: 7 % (ref 4–12)
NEUTROPHILS # BLD AUTO: 4 THOUSANDS/ΜL (ref 1.85–7.62)
NEUTS SEG NFR BLD AUTO: 67 % (ref 43–75)
NRBC BLD AUTO-RTO: 0 /100 WBCS
PLATELET # BLD AUTO: 245 THOUSANDS/UL (ref 149–390)
PMV BLD AUTO: 10.3 FL (ref 8.9–12.7)
RBC # BLD AUTO: 4.36 MILLION/UL (ref 3.88–5.62)
WBC # BLD AUTO: 5.99 THOUSAND/UL (ref 4.31–10.16)

## 2022-02-08 PROCEDURE — 85025 COMPLETE CBC W/AUTO DIFF WBC: CPT | Performed by: INTERNAL MEDICINE

## 2022-02-08 PROCEDURE — 86140 C-REACTIVE PROTEIN: CPT | Performed by: INTERNAL MEDICINE

## 2022-02-08 PROCEDURE — 36415 COLL VENOUS BLD VENIPUNCTURE: CPT | Performed by: INTERNAL MEDICINE

## 2022-02-08 PROCEDURE — 85652 RBC SED RATE AUTOMATED: CPT | Performed by: INTERNAL MEDICINE

## 2022-02-15 ENCOUNTER — OFFICE VISIT (OUTPATIENT)
Dept: RHEUMATOLOGY | Facility: CLINIC | Age: 57
End: 2022-02-15
Payer: COMMERCIAL

## 2022-02-15 VITALS
SYSTOLIC BLOOD PRESSURE: 110 MMHG | DIASTOLIC BLOOD PRESSURE: 76 MMHG | BODY MASS INDEX: 21.47 KG/M2 | HEIGHT: 70 IN | WEIGHT: 150 LBS

## 2022-02-15 DIAGNOSIS — M05.79 RHEUMATOID ARTHRITIS INVOLVING MULTIPLE SITES WITH POSITIVE RHEUMATOID FACTOR (HCC): ICD-10-CM

## 2022-02-15 DIAGNOSIS — M19.90 ARTHRITIS: ICD-10-CM

## 2022-02-15 PROCEDURE — 99213 OFFICE O/P EST LOW 20 MIN: CPT | Performed by: INTERNAL MEDICINE

## 2022-02-15 RX ORDER — METHYLPREDNISOLONE 4 MG/1
TABLET ORAL
Qty: 90 TABLET | Refills: 1 | Status: SHIPPED | OUTPATIENT
Start: 2022-02-15

## 2022-02-15 RX ORDER — FOLIC ACID 1 MG/1
2 TABLET ORAL
Qty: 180 TABLET | Refills: 3 | Status: SHIPPED | OUTPATIENT
Start: 2022-02-15 | End: 2022-06-21 | Stop reason: SDUPTHER

## 2022-02-15 RX ORDER — METHOTREXATE 2.5 MG/1
TABLET ORAL
Qty: 60 TABLET | Refills: 2 | Status: SHIPPED | OUTPATIENT
Start: 2022-02-15 | End: 2022-04-07

## 2022-02-15 NOTE — PROGRESS NOTES
Assessment and Plan:   Seropositive RA--continue MTX 15mg weekly  Continue folic acid 2mg daily  Counseled on tobacco use cessation  Check labs every 12 weeks  F/u in 4 mos  or sooner if necessary  Rheumatic Disease Summary  As above    Here for f/u visit  Overall doing well  No f/c/ns  Much less joint stiffness  No side-effects on medication  The following portions of the patient's history were reviewed and updated as appropriate: allergies, current medications, past family history, past medical history, past social history, past surgical history and problem list     Review of Systems:   Review of Systems   Constitutional: Negative for fatigue  HENT: Negative for mouth sores  Eyes: Negative for pain  Respiratory: Negative for shortness of breath  Cardiovascular: Negative for leg swelling  Musculoskeletal: Negative for arthralgias and joint swelling  Skin: Negative for rash  Neurological: Negative for weakness  Hematological: Negative for adenopathy  Psychiatric/Behavioral: Negative for sleep disturbance         Home Medications:    Current Outpatient Medications:     folic acid (FOLVITE) 1 mg tablet, Take 2 tablets (2 mg total) by mouth daily at bedtime, Disp: 180 tablet, Rfl: 3    meloxicam (MOBIC) 15 mg tablet, TAKE 1 TABLET(15 MG) BY MOUTH DAILY, Disp: 30 tablet, Rfl: 0    methotrexate 2 5 MG tablet, TAKE 6 TABLETS BY MOUTH 1 TIME A WEEK WITH BREAKFAST, Disp: 60 tablet, Rfl: 2    methylprednisolone (MEDROL) 4 mg tablet, One tablet a day, Disp: 90 tablet, Rfl: 1    omeprazole (PriLOSEC) 20 mg delayed release capsule, Take 1 capsule (20 mg total) by mouth daily, Disp: 90 capsule, Rfl: 0    traMADol (ULTRAM) 50 mg tablet, Take 1 tablet (50 mg total) by mouth every 8 (eight) hours as needed for moderate pain, Disp: 30 tablet, Rfl: 0    Objective:    Vitals:    02/15/22 0919   BP: 110/76   Weight: 68 kg (150 lb)   Height: 5' 10" (1 778 m)       Physical Exam  Constitutional:       General: He is not in acute distress  HENT:      Head: Normocephalic and atraumatic  Eyes:      Conjunctiva/sclera: Conjunctivae normal    Cardiovascular:      Rate and Rhythm: Normal rate and regular rhythm  Heart sounds: S1 normal and S2 normal  No friction rub  Pulmonary:      Effort: Pulmonary effort is normal  No respiratory distress  Breath sounds: Normal breath sounds  No wheezing, rhonchi or rales  Musculoskeletal:      Cervical back: Neck supple  Skin:     Coloration: Skin is not pale  Neurological:      Mental Status: He is alert  Mental status is at baseline  Psychiatric:         Mood and Affect: Mood normal          Behavior: Behavior normal          Reviewed labs and imaging  Imaging:   No results found  Labs: Ancillary Orders on 12/24/2021   Component Date Value Ref Range Status    Sodium 02/08/2022 140  136 - 145 mmol/L Final    Potassium 02/08/2022 5 1  3 5 - 5 3 mmol/L Final    Chloride 02/08/2022 104  100 - 108 mmol/L Final    CO2 02/08/2022 29  21 - 32 mmol/L Final    ANION GAP 02/08/2022 7  4 - 13 mmol/L Final    BUN 02/08/2022 13  5 - 25 mg/dL Final    Creatinine 02/08/2022 0 90  0 60 - 1 30 mg/dL Final    Standardized to IDMS reference method    Glucose 02/08/2022 94  65 - 140 mg/dL Final    If the patient is fasting, the ADA then defines impaired fasting glucose as > 100 mg/dL and diabetes as > or equal to 123 mg/dL  Specimen collection should occur prior to Sulfasalazine administration due to the potential for falsely depressed results  Specimen collection should occur prior to Sulfapyridine administration due to the potential for falsely elevated results   Calcium 02/08/2022 9 3  8 3 - 10 1 mg/dL Final    AST 02/08/2022 22  5 - 45 U/L Final    Specimen collection should occur prior to Sulfasalazine administration due to the potential for falsely depressed results       ALT 02/08/2022 27  12 - 78 U/L Final    Specimen collection should occur prior to Sulfasalazine administration due to the potential for falsely depressed results   Alkaline Phosphatase 02/08/2022 57  46 - 116 U/L Final    Total Protein 02/08/2022 6 8  6 4 - 8 2 g/dL Final    Albumin 02/08/2022 3 9  3 5 - 5 0 g/dL Final    Total Bilirubin 02/08/2022 0 44  0 20 - 1 00 mg/dL Final    Use of this assay is not recommended for patients undergoing treatment with eltrombopag due to the potential for falsely elevated results      eGFR 02/08/2022 95  ml/min/1 73sq m Final   Office Visit on 11/09/2021   Component Date Value Ref Range Status    WBC 02/08/2022 5 99  4 31 - 10 16 Thousand/uL Final    RBC 02/08/2022 4 36  3 88 - 5 62 Million/uL Final    Hemoglobin 02/08/2022 14 1  12 0 - 17 0 g/dL Final    Hematocrit 02/08/2022 43 3  36 5 - 49 3 % Final    MCV 02/08/2022 99* 82 - 98 fL Final    MCH 02/08/2022 32 3  26 8 - 34 3 pg Final    MCHC 02/08/2022 32 6  31 4 - 37 4 g/dL Final    RDW 02/08/2022 14 0  11 6 - 15 1 % Final    MPV 02/08/2022 10 3  8 9 - 12 7 fL Final    Platelets 46/18/8369 245  149 - 390 Thousands/uL Final    nRBC 02/08/2022 0  /100 WBCs Final    Neutrophils Relative 02/08/2022 67  43 - 75 % Final    Immat GRANS % 02/08/2022 0  0 - 2 % Final    Lymphocytes Relative 02/08/2022 25  14 - 44 % Final    Monocytes Relative 02/08/2022 7  4 - 12 % Final    Eosinophils Relative 02/08/2022 1  0 - 6 % Final    Basophils Relative 02/08/2022 0  0 - 1 % Final    Neutrophils Absolute 02/08/2022 4 00  1 85 - 7 62 Thousands/µL Final    Immature Grans Absolute 02/08/2022 0 01  0 00 - 0 20 Thousand/uL Final    Lymphocytes Absolute 02/08/2022 1 48  0 60 - 4 47 Thousands/µL Final    Monocytes Absolute 02/08/2022 0 43  0 17 - 1 22 Thousand/µL Final    Eosinophils Absolute 02/08/2022 0 06  0 00 - 0 61 Thousand/µL Final    Basophils Absolute 02/08/2022 0 01  0 00 - 0 10 Thousands/µL Final    Sed Rate 02/08/2022 6  0 - 19 mm/hour Final    CRP 02/08/2022 <3 0  <3 0 mg/L Final   Office Visit on 07/13/2021   Component Date Value Ref Range Status    Sodium 10/30/2021 143  136 - 145 mmol/L Final    Potassium 10/30/2021 5 2  3 5 - 5 3 mmol/L Final    Chloride 10/30/2021 102  100 - 108 mmol/L Final    CO2 10/30/2021 35* 21 - 32 mmol/L Final    ANION GAP 10/30/2021 6  4 - 13 mmol/L Final    BUN 10/30/2021 13  5 - 25 mg/dL Final    Creatinine 10/30/2021 1 17  0 60 - 1 30 mg/dL Final    Standardized to IDMS reference method    Glucose, Fasting 10/30/2021 116* 65 - 99 mg/dL Final    Specimen collection should occur prior to Sulfasalazine administration due to the potential for falsely depressed results  Specimen collection should occur prior to Sulfapyridine administration due to the potential for falsely elevated results   Calcium 10/30/2021 9 6  8 3 - 10 1 mg/dL Final    AST 10/30/2021 23  5 - 45 U/L Final    Specimen collection should occur prior to Sulfasalazine administration due to the potential for falsely depressed results   ALT 10/30/2021 33  12 - 78 U/L Final    Specimen collection should occur prior to Sulfasalazine administration due to the potential for falsely depressed results   Alkaline Phosphatase 10/30/2021 61  46 - 116 U/L Final    Total Protein 10/30/2021 7 0  6 4 - 8 2 g/dL Final    Albumin 10/30/2021 3 7  3 5 - 5 0 g/dL Final    Total Bilirubin 10/30/2021 0 51  0 20 - 1 00 mg/dL Final    Use of this assay is not recommended for patients undergoing treatment with eltrombopag due to the potential for falsely elevated results      eGFR 10/30/2021 70  ml/min/1 73sq m Final    CRP 10/30/2021 6 0* <3 0 mg/L Final    WBC 10/30/2021 10 49* 4 31 - 10 16 Thousand/uL Final    RBC 10/30/2021 4 80  3 88 - 5 62 Million/uL Final    Hemoglobin 10/30/2021 15 7  12 0 - 17 0 g/dL Final    Hematocrit 10/30/2021 48 4  36 5 - 49 3 % Final    MCV 10/30/2021 101* 82 - 98 fL Final    MCH 10/30/2021 32 7  26 8 - 34 3 pg Final    MCHC 10/30/2021 32 4  31 4 - 37 4 g/dL Final    RDW 10/30/2021 13 6  11 6 - 15 1 % Final    MPV 10/30/2021 10 4  8 9 - 12 7 fL Final    Platelets 80/60/7030 248  149 - 390 Thousands/uL Final    nRBC 10/30/2021 0  /100 WBCs Final    Neutrophils Relative 10/30/2021 65  43 - 75 % Final    Immat GRANS % 10/30/2021 1  0 - 2 % Final    Lymphocytes Relative 10/30/2021 24  14 - 44 % Final    Monocytes Relative 10/30/2021 9  4 - 12 % Final    Eosinophils Relative 10/30/2021 1  0 - 6 % Final    Basophils Relative 10/30/2021 0  0 - 1 % Final    Neutrophils Absolute 10/30/2021 6 77  1 85 - 7 62 Thousands/µL Final    Immature Grans Absolute 10/30/2021 0 06  0 00 - 0 20 Thousand/uL Final    Lymphocytes Absolute 10/30/2021 2 54  0 60 - 4 47 Thousands/µL Final    Monocytes Absolute 10/30/2021 0 95  0 17 - 1 22 Thousand/µL Final    Eosinophils Absolute 10/30/2021 0 14  0 00 - 0 61 Thousand/µL Final    Basophils Absolute 10/30/2021 0 03  0 00 - 0 10 Thousands/µL Final   Appointment on 07/13/2021   Component Date Value Ref Range Status    Cyclic Citrullinated Peptide Ab  07/13/2021 1 0  See comment Final    Hepatitis B Surface Ag 07/13/2021 Non-reactive  Non-reactive, NonReactive - Confirmed Final    Hepatitis C Ab 07/13/2021 Non-reactive  Non-reactive Final    QFT Nil 07/13/2021 0 03  0 - 8 0 IU/ml Final    QFT TB1-NIL 07/13/2021 0 00  IU/ml Final    QFT TB2-NIL 07/13/2021 0 00  IU/ml Final    QFT Mitogen-NIL 07/13/2021 3 25  IU/ml Final    QFT Final Interpretation 07/13/2021 Negative  Negative Final    No Interferon-gamma response to M  tuberculosis antigens detected  Infection with M  tuberculosis is unlikely  A single negative result does not exclude infection with M  tuberculosis  In patients at high risk for M  tuberculosis infection, a second test should be considered in accordance with the 2017 ATS/IDSA/CDC Clinical Practice Guidelines for Diagnosis of Tuberculosis in Adults and Children   False negative results can be a result of incorrect blood sample collection or handling of the specimen affecting lymphocyte function      CRP 07/13/2021 3 1* <3 0 mg/L Final    WBC 07/13/2021 5 21  4 31 - 10 16 Thousand/uL Final    RBC 07/13/2021 4 74  3 88 - 5 62 Million/uL Final    Hemoglobin 07/13/2021 15 2  12 0 - 17 0 g/dL Final    Hematocrit 07/13/2021 46 9  36 5 - 49 3 % Final    MCV 07/13/2021 99* 82 - 98 fL Final    MCH 07/13/2021 32 1  26 8 - 34 3 pg Final    MCHC 07/13/2021 32 4  31 4 - 37 4 g/dL Final    RDW 07/13/2021 13 9  11 6 - 15 1 % Final    MPV 07/13/2021 11 0  8 9 - 12 7 fL Final    Platelets 40/42/3179 210  149 - 390 Thousands/uL Final    nRBC 07/13/2021 0  /100 WBCs Final    Neutrophils Relative 07/13/2021 44  43 - 75 % Final    Immat GRANS % 07/13/2021 0  0 - 2 % Final    Lymphocytes Relative 07/13/2021 36  14 - 44 % Final    Monocytes Relative 07/13/2021 14* 4 - 12 % Final    Eosinophils Relative 07/13/2021 6  0 - 6 % Final    Basophils Relative 07/13/2021 0  0 - 1 % Final    Neutrophils Absolute 07/13/2021 2 23  1 85 - 7 62 Thousands/µL Final    Immature Grans Absolute 07/13/2021 0 01  0 00 - 0 20 Thousand/uL Final    Lymphocytes Absolute 07/13/2021 1 88  0 60 - 4 47 Thousands/µL Final    Monocytes Absolute 07/13/2021 0 75  0 17 - 1 22 Thousand/µL Final    Eosinophils Absolute 07/13/2021 0 32  0 00 - 0 61 Thousand/µL Final    Basophils Absolute 07/13/2021 0 02  0 00 - 0 10 Thousands/µL Final    Sed Rate 07/13/2021 12  0 - 19 mm/hour Final    Sodium 07/13/2021 140  136 - 145 mmol/L Final    Potassium 07/13/2021 5 2  3 5 - 5 3 mmol/L Final    Chloride 07/13/2021 102  100 - 108 mmol/L Final    CO2 07/13/2021 32  21 - 32 mmol/L Final    ANION GAP 07/13/2021 6  4 - 13 mmol/L Final    BUN 07/13/2021 19  5 - 25 mg/dL Final    Creatinine 07/13/2021 1 09  0 60 - 1 30 mg/dL Final    Standardized to IDMS reference method    Glucose 07/13/2021 119  65 - 140 mg/dL Final    If the patient is fasting, the ADA then defines impaired fasting glucose as > 100 mg/dL and diabetes as > or equal to 123 mg/dL  Specimen collection should occur prior to Sulfasalazine administration due to the potential for falsely depressed results  Specimen collection should occur prior to Sulfapyridine administration due to the potential for falsely elevated results   Calcium 07/13/2021 10 0  8 3 - 10 1 mg/dL Final    AST 07/13/2021 21  5 - 45 U/L Final    Specimen collection should occur prior to Sulfasalazine administration due to the potential for falsely depressed results   ALT 07/13/2021 24  12 - 78 U/L Final    Specimen collection should occur prior to Sulfasalazine administration due to the potential for falsely depressed results   Alkaline Phosphatase 07/13/2021 71  46 - 116 U/L Final    Total Protein 07/13/2021 7 6  6 4 - 8 2 g/dL Final    Albumin 07/13/2021 4 0  3 5 - 5 0 g/dL Final    Total Bilirubin 07/13/2021 0 43  0 20 - 1 00 mg/dL Final    Use of this assay is not recommended for patients undergoing treatment with eltrombopag due to the potential for falsely elevated results      eGFR 07/13/2021 76  ml/min/1 73sq m Final

## 2022-02-15 NOTE — PATIENT INSTRUCTIONS
Continue taking the methotrexate 6 weekly and folic acid 2 tabs daily  Have your blood work done every 3 months

## 2022-03-25 DIAGNOSIS — M05.79 RHEUMATOID ARTHRITIS INVOLVING MULTIPLE SITES WITH POSITIVE RHEUMATOID FACTOR (HCC): ICD-10-CM

## 2022-03-28 RX ORDER — OMEPRAZOLE 20 MG/1
CAPSULE, DELAYED RELEASE ORAL
Qty: 90 CAPSULE | Refills: 0 | Status: SHIPPED | OUTPATIENT
Start: 2022-03-28 | End: 2022-06-21 | Stop reason: SDUPTHER

## 2022-03-29 ENCOUNTER — TELEPHONE (OUTPATIENT)
Dept: FAMILY MEDICINE CLINIC | Facility: CLINIC | Age: 57
End: 2022-03-29

## 2022-03-29 NOTE — TELEPHONE ENCOUNTER
Left message for patient to return call back, overdue for annual appt  Please schedule  Also overdue for colonoscopy/ cologuard

## 2022-04-07 DIAGNOSIS — M19.90 ARTHRITIS: ICD-10-CM

## 2022-04-07 DIAGNOSIS — M05.79 RHEUMATOID ARTHRITIS INVOLVING MULTIPLE SITES WITH POSITIVE RHEUMATOID FACTOR (HCC): ICD-10-CM

## 2022-06-20 ENCOUNTER — APPOINTMENT (OUTPATIENT)
Dept: LAB | Facility: HOSPITAL | Age: 57
End: 2022-06-20
Payer: COMMERCIAL

## 2022-06-21 ENCOUNTER — OFFICE VISIT (OUTPATIENT)
Dept: RHEUMATOLOGY | Facility: CLINIC | Age: 57
End: 2022-06-21
Payer: COMMERCIAL

## 2022-06-21 VITALS
BODY MASS INDEX: 21.22 KG/M2 | HEIGHT: 70 IN | SYSTOLIC BLOOD PRESSURE: 122 MMHG | DIASTOLIC BLOOD PRESSURE: 70 MMHG | WEIGHT: 148.2 LBS

## 2022-06-21 DIAGNOSIS — M05.79 RHEUMATOID ARTHRITIS INVOLVING MULTIPLE SITES WITH POSITIVE RHEUMATOID FACTOR (HCC): ICD-10-CM

## 2022-06-21 DIAGNOSIS — M19.90 ARTHRITIS: ICD-10-CM

## 2022-06-21 PROCEDURE — 99214 OFFICE O/P EST MOD 30 MIN: CPT | Performed by: INTERNAL MEDICINE

## 2022-06-21 RX ORDER — FOLIC ACID 1 MG/1
2 TABLET ORAL
Qty: 180 TABLET | Refills: 3 | Status: SHIPPED | OUTPATIENT
Start: 2022-06-21 | End: 2022-07-06

## 2022-06-21 RX ORDER — OMEPRAZOLE 20 MG/1
20 CAPSULE, DELAYED RELEASE ORAL DAILY
Qty: 90 CAPSULE | Refills: 3 | Status: SHIPPED | OUTPATIENT
Start: 2022-06-21

## 2022-06-21 NOTE — PATIENT INSTRUCTIONS
Take 7 methotrexate once a week (split the dose 4 tabs and a few hours later 3 tabs)  Take the folic acid at night (at a different time of day than the methotrexate because if you take the folic acid with the metho  It is less effective)  Take the methylprednisolone as needed for joint pain and stiffness

## 2022-06-21 NOTE — PROGRESS NOTES
Assessment and Plan:   RA--increase MTX to 17 5 mg weekly  Continue folic acid daily  Medrol PRN joint pain  Check labs every 3 months  F/u in 4 mos  Rheumatic Disease Summary  As above    Here for f/u visit  Still with joint pain and stiffness  No side effects on medications  The following portions of the patient's history were reviewed and updated as appropriate: allergies, current medications, past family history, past medical history, past social history, past surgical history and problem list     Review of Systems:   Review of Systems   Constitutional: Negative for fatigue  HENT: Negative for mouth sores  Eyes: Negative for pain  Respiratory: Negative for shortness of breath  Cardiovascular: Negative for leg swelling  Musculoskeletal: Positive for arthralgias  Negative for joint swelling  Skin: Negative for rash  Neurological: Negative for weakness  Hematological: Negative for adenopathy  Psychiatric/Behavioral: Negative for sleep disturbance  Home Medications:    Current Outpatient Medications:     folic acid (FOLVITE) 1 mg tablet, Take 2 tablets (2 mg total) by mouth daily at bedtime, Disp: 180 tablet, Rfl: 3    methotrexate 2 5 mg tablet, TAKE 7 TABLETS BY MOUTH 1 TIME A WEEK WITH BREAKFAST (take 4 tabs in the morning and 3 tabs in the afternoon)  Splitting the dose helps the medication to get absorbed better  , Disp: 86 tablet, Rfl: 2    omeprazole (PriLOSEC) 20 mg delayed release capsule, Take 1 capsule (20 mg total) by mouth daily, Disp: 90 capsule, Rfl: 3    methylprednisolone (MEDROL) 4 mg tablet, One tablet a day, Disp: 90 tablet, Rfl: 1    traMADol (ULTRAM) 50 mg tablet, Take 1 tablet (50 mg total) by mouth every 8 (eight) hours as needed for moderate pain, Disp: 30 tablet, Rfl: 0    Objective:    Vitals:    06/21/22 0958   BP: 122/70   Weight: 67 2 kg (148 lb 3 2 oz)   Height: 5' 10" (1 778 m)       Physical Exam  Constitutional:       General: He is not in acute distress  HENT:      Head: Normocephalic and atraumatic  Eyes:      Conjunctiva/sclera: Conjunctivae normal    Cardiovascular:      Rate and Rhythm: Normal rate and regular rhythm  Heart sounds: S1 normal and S2 normal      No friction rub  Pulmonary:      Effort: Pulmonary effort is normal  No respiratory distress  Breath sounds: Normal breath sounds  No wheezing, rhonchi or rales  Musculoskeletal:      Cervical back: Neck supple  Skin:     Coloration: Skin is not pale  Neurological:      Mental Status: He is alert  Mental status is at baseline  Psychiatric:         Mood and Affect: Mood normal          Behavior: Behavior normal          Reviewed labs and imaging  Imaging:   No results found  Labs:    Ancillary Orders on 05/06/2022   Component Date Value Ref Range Status    WBC 06/20/2022 7 74  4 31 - 10 16 Thousand/uL Final    RBC 06/20/2022 4 30  3 88 - 5 62 Million/uL Final    Hemoglobin 06/20/2022 14 0  12 0 - 17 0 g/dL Final    Hematocrit 06/20/2022 41 9  36 5 - 49 3 % Final    MCV 06/20/2022 97  82 - 98 fL Final    MCH 06/20/2022 32 6  26 8 - 34 3 pg Final    MCHC 06/20/2022 33 4  31 4 - 37 4 g/dL Final    RDW 06/20/2022 13 9  11 6 - 15 1 % Final    MPV 06/20/2022 10 8  8 9 - 12 7 fL Final    Platelets 55/75/6286 194  149 - 390 Thousands/uL Final    nRBC 06/20/2022 0  /100 WBCs Final    Neutrophils Relative 06/20/2022 72  43 - 75 % Final    Immat GRANS % 06/20/2022 0  0 - 2 % Final    Lymphocytes Relative 06/20/2022 19  14 - 44 % Final    Monocytes Relative 06/20/2022 8  4 - 12 % Final    Eosinophils Relative 06/20/2022 1  0 - 6 % Final    Basophils Relative 06/20/2022 0  0 - 1 % Final    Neutrophils Absolute 06/20/2022 5 57  1 85 - 7 62 Thousands/µL Final    Immature Grans Absolute 06/20/2022 0 01  0 00 - 0 20 Thousand/uL Final    Lymphocytes Absolute 06/20/2022 1 48  0 60 - 4 47 Thousands/µL Final    Monocytes Absolute 06/20/2022 0 62  0 17 - 1 22 Thousand/µL Final    Eosinophils Absolute 06/20/2022 0 04  0 00 - 0 61 Thousand/µL Final    Basophils Absolute 06/20/2022 0 02  0 00 - 0 10 Thousands/µL Final    Sodium 06/20/2022 142  136 - 145 mmol/L Final    Potassium 06/20/2022 3 9  3 5 - 5 3 mmol/L Final    Chloride 06/20/2022 103  100 - 108 mmol/L Final    CO2 06/20/2022 30  21 - 32 mmol/L Final    ANION GAP 06/20/2022 9  4 - 13 mmol/L Final    BUN 06/20/2022 18  5 - 25 mg/dL Final    Creatinine 06/20/2022 0 91  0 60 - 1 30 mg/dL Final    Standardized to IDMS reference method    Glucose 06/20/2022 82  65 - 140 mg/dL Final    If the patient is fasting, the ADA then defines impaired fasting glucose as > 100 mg/dL and diabetes as > or equal to 123 mg/dL  Specimen collection should occur prior to Sulfasalazine administration due to the potential for falsely depressed results  Specimen collection should occur prior to Sulfapyridine administration due to the potential for falsely elevated results   Calcium 06/20/2022 9 3  8 3 - 10 1 mg/dL Final    AST 06/20/2022 17  5 - 45 U/L Final    Specimen collection should occur prior to Sulfasalazine administration due to the potential for falsely depressed results   ALT 06/20/2022 19  12 - 78 U/L Final    Specimen collection should occur prior to Sulfasalazine administration due to the potential for falsely depressed results   Alkaline Phosphatase 06/20/2022 65  46 - 116 U/L Final    Total Protein 06/20/2022 6 9  6 4 - 8 2 g/dL Final    Albumin 06/20/2022 3 6  3 5 - 5 0 g/dL Final    Total Bilirubin 06/20/2022 0 34  0 20 - 1 00 mg/dL Final    Use of this assay is not recommended for patients undergoing treatment with eltrombopag due to the potential for falsely elevated results      eGFR 06/20/2022 93  ml/min/1 73sq m Final    CRP 06/20/2022 12 0 (A) <3 0 mg/L Final   Ancillary Orders on 04/22/2022   Component Date Value Ref Range Status    Sed Rate 06/20/2022 34 (A) 0 - 19 mm/hour Final Ancillary Orders on 12/24/2021   Component Date Value Ref Range Status    Sodium 02/08/2022 140  136 - 145 mmol/L Final    Potassium 02/08/2022 5 1  3 5 - 5 3 mmol/L Final    Chloride 02/08/2022 104  100 - 108 mmol/L Final    CO2 02/08/2022 29  21 - 32 mmol/L Final    ANION GAP 02/08/2022 7  4 - 13 mmol/L Final    BUN 02/08/2022 13  5 - 25 mg/dL Final    Creatinine 02/08/2022 0 90  0 60 - 1 30 mg/dL Final    Standardized to IDMS reference method    Glucose 02/08/2022 94  65 - 140 mg/dL Final    If the patient is fasting, the ADA then defines impaired fasting glucose as > 100 mg/dL and diabetes as > or equal to 123 mg/dL  Specimen collection should occur prior to Sulfasalazine administration due to the potential for falsely depressed results  Specimen collection should occur prior to Sulfapyridine administration due to the potential for falsely elevated results   Calcium 02/08/2022 9 3  8 3 - 10 1 mg/dL Final    AST 02/08/2022 22  5 - 45 U/L Final    Specimen collection should occur prior to Sulfasalazine administration due to the potential for falsely depressed results   ALT 02/08/2022 27  12 - 78 U/L Final    Specimen collection should occur prior to Sulfasalazine administration due to the potential for falsely depressed results   Alkaline Phosphatase 02/08/2022 57  46 - 116 U/L Final    Total Protein 02/08/2022 6 8  6 4 - 8 2 g/dL Final    Albumin 02/08/2022 3 9  3 5 - 5 0 g/dL Final    Total Bilirubin 02/08/2022 0 44  0 20 - 1 00 mg/dL Final    Use of this assay is not recommended for patients undergoing treatment with eltrombopag due to the potential for falsely elevated results      eGFR 02/08/2022 95  ml/min/1 73sq m Final   Office Visit on 11/09/2021   Component Date Value Ref Range Status    WBC 02/08/2022 5 99  4 31 - 10 16 Thousand/uL Final    RBC 02/08/2022 4 36  3 88 - 5 62 Million/uL Final    Hemoglobin 02/08/2022 14 1  12 0 - 17 0 g/dL Final    Hematocrit 02/08/2022 43 3 36 5 - 49 3 % Final    MCV 02/08/2022 99 (A) 82 - 98 fL Final    MCH 02/08/2022 32 3  26 8 - 34 3 pg Final    MCHC 02/08/2022 32 6  31 4 - 37 4 g/dL Final    RDW 02/08/2022 14 0  11 6 - 15 1 % Final    MPV 02/08/2022 10 3  8 9 - 12 7 fL Final    Platelets 17/66/7155 245  149 - 390 Thousands/uL Final    nRBC 02/08/2022 0  /100 WBCs Final    Neutrophils Relative 02/08/2022 67  43 - 75 % Final    Immat GRANS % 02/08/2022 0  0 - 2 % Final    Lymphocytes Relative 02/08/2022 25  14 - 44 % Final    Monocytes Relative 02/08/2022 7  4 - 12 % Final    Eosinophils Relative 02/08/2022 1  0 - 6 % Final    Basophils Relative 02/08/2022 0  0 - 1 % Final    Neutrophils Absolute 02/08/2022 4 00  1 85 - 7 62 Thousands/µL Final    Immature Grans Absolute 02/08/2022 0 01  0 00 - 0 20 Thousand/uL Final    Lymphocytes Absolute 02/08/2022 1 48  0 60 - 4 47 Thousands/µL Final    Monocytes Absolute 02/08/2022 0 43  0 17 - 1 22 Thousand/µL Final    Eosinophils Absolute 02/08/2022 0 06  0 00 - 0 61 Thousand/µL Final    Basophils Absolute 02/08/2022 0 01  0 00 - 0 10 Thousands/µL Final    Sed Rate 02/08/2022 6  0 - 19 mm/hour Final    CRP 02/08/2022 <3 0  <3 0 mg/L Final   Office Visit on 07/13/2021   Component Date Value Ref Range Status    Sodium 10/30/2021 143  136 - 145 mmol/L Final    Potassium 10/30/2021 5 2  3 5 - 5 3 mmol/L Final    Chloride 10/30/2021 102  100 - 108 mmol/L Final    CO2 10/30/2021 35 (A) 21 - 32 mmol/L Final    ANION GAP 10/30/2021 6  4 - 13 mmol/L Final    BUN 10/30/2021 13  5 - 25 mg/dL Final    Creatinine 10/30/2021 1 17  0 60 - 1 30 mg/dL Final    Standardized to IDMS reference method    Glucose, Fasting 10/30/2021 116 (A) 65 - 99 mg/dL Final    Specimen collection should occur prior to Sulfasalazine administration due to the potential for falsely depressed results  Specimen collection should occur prior to Sulfapyridine administration due to the potential for falsely elevated results   Calcium 10/30/2021 9 6  8 3 - 10 1 mg/dL Final    AST 10/30/2021 23  5 - 45 U/L Final    Specimen collection should occur prior to Sulfasalazine administration due to the potential for falsely depressed results   ALT 10/30/2021 33  12 - 78 U/L Final    Specimen collection should occur prior to Sulfasalazine administration due to the potential for falsely depressed results   Alkaline Phosphatase 10/30/2021 61  46 - 116 U/L Final    Total Protein 10/30/2021 7 0  6 4 - 8 2 g/dL Final    Albumin 10/30/2021 3 7  3 5 - 5 0 g/dL Final    Total Bilirubin 10/30/2021 0 51  0 20 - 1 00 mg/dL Final    Use of this assay is not recommended for patients undergoing treatment with eltrombopag due to the potential for falsely elevated results      eGFR 10/30/2021 70  ml/min/1 73sq m Final    CRP 10/30/2021 6 0 (A) <3 0 mg/L Final    WBC 10/30/2021 10 49 (A) 4 31 - 10 16 Thousand/uL Final    RBC 10/30/2021 4 80  3 88 - 5 62 Million/uL Final    Hemoglobin 10/30/2021 15 7  12 0 - 17 0 g/dL Final    Hematocrit 10/30/2021 48 4  36 5 - 49 3 % Final    MCV 10/30/2021 101 (A) 82 - 98 fL Final    MCH 10/30/2021 32 7  26 8 - 34 3 pg Final    MCHC 10/30/2021 32 4  31 4 - 37 4 g/dL Final    RDW 10/30/2021 13 6  11 6 - 15 1 % Final    MPV 10/30/2021 10 4  8 9 - 12 7 fL Final    Platelets 17/38/0404 248  149 - 390 Thousands/uL Final    nRBC 10/30/2021 0  /100 WBCs Final    Neutrophils Relative 10/30/2021 65  43 - 75 % Final    Immat GRANS % 10/30/2021 1  0 - 2 % Final    Lymphocytes Relative 10/30/2021 24  14 - 44 % Final    Monocytes Relative 10/30/2021 9  4 - 12 % Final    Eosinophils Relative 10/30/2021 1  0 - 6 % Final    Basophils Relative 10/30/2021 0  0 - 1 % Final    Neutrophils Absolute 10/30/2021 6 77  1 85 - 7 62 Thousands/µL Final    Immature Grans Absolute 10/30/2021 0 06  0 00 - 0 20 Thousand/uL Final    Lymphocytes Absolute 10/30/2021 2 54  0 60 - 4 47 Thousands/µL Final    Monocytes Absolute 10/30/2021 0 95  0 17 - 1 22 Thousand/µL Final    Eosinophils Absolute 10/30/2021 0 14  0 00 - 0 61 Thousand/µL Final    Basophils Absolute 10/30/2021 0 03  0 00 - 0 10 Thousands/µL Final   Appointment on 07/13/2021   Component Date Value Ref Range Status    Cyclic Citrullinated Peptide Ab  07/13/2021 1 0  See comment Final    Hepatitis B Surface Ag 07/13/2021 Non-reactive  Non-reactive, NonReactive - Confirmed Final    Hepatitis C Ab 07/13/2021 Non-reactive  Non-reactive Final    QFT Nil 07/13/2021 0 03  0 - 8 0 IU/ml Final    QFT TB1-NIL 07/13/2021 0 00  IU/ml Final    QFT TB2-NIL 07/13/2021 0 00  IU/ml Final    QFT Mitogen-NIL 07/13/2021 3 25  IU/ml Final    QFT Final Interpretation 07/13/2021 Negative  Negative Final    No Interferon-gamma response to M  tuberculosis antigens detected  Infection with M  tuberculosis is unlikely  A single negative result does not exclude infection with M  tuberculosis  In patients at high risk for M  tuberculosis infection, a second test should be considered in accordance with the 2017 ATS/IDSA/CDC Clinical Practice Guidelines for Diagnosis of Tuberculosis in Adults and Children  False negative results can be a result of incorrect blood sample collection or handling of the specimen affecting lymphocyte function      CRP 07/13/2021 3 1 (A) <3 0 mg/L Final    WBC 07/13/2021 5 21  4 31 - 10 16 Thousand/uL Final    RBC 07/13/2021 4 74  3 88 - 5 62 Million/uL Final    Hemoglobin 07/13/2021 15 2  12 0 - 17 0 g/dL Final    Hematocrit 07/13/2021 46 9  36 5 - 49 3 % Final    MCV 07/13/2021 99 (A) 82 - 98 fL Final    MCH 07/13/2021 32 1  26 8 - 34 3 pg Final    MCHC 07/13/2021 32 4  31 4 - 37 4 g/dL Final    RDW 07/13/2021 13 9  11 6 - 15 1 % Final    MPV 07/13/2021 11 0  8 9 - 12 7 fL Final    Platelets 07/04/5959 210  149 - 390 Thousands/uL Final    nRBC 07/13/2021 0  /100 WBCs Final    Neutrophils Relative 07/13/2021 44  43 - 75 % Final    Immat GRANS % 07/13/2021 0  0 - 2 % Final    Lymphocytes Relative 07/13/2021 36  14 - 44 % Final    Monocytes Relative 07/13/2021 14 (A) 4 - 12 % Final    Eosinophils Relative 07/13/2021 6  0 - 6 % Final    Basophils Relative 07/13/2021 0  0 - 1 % Final    Neutrophils Absolute 07/13/2021 2 23  1 85 - 7 62 Thousands/µL Final    Immature Grans Absolute 07/13/2021 0 01  0 00 - 0 20 Thousand/uL Final    Lymphocytes Absolute 07/13/2021 1 88  0 60 - 4 47 Thousands/µL Final    Monocytes Absolute 07/13/2021 0 75  0 17 - 1 22 Thousand/µL Final    Eosinophils Absolute 07/13/2021 0 32  0 00 - 0 61 Thousand/µL Final    Basophils Absolute 07/13/2021 0 02  0 00 - 0 10 Thousands/µL Final    Sed Rate 07/13/2021 12  0 - 19 mm/hour Final    Sodium 07/13/2021 140  136 - 145 mmol/L Final    Potassium 07/13/2021 5 2  3 5 - 5 3 mmol/L Final    Chloride 07/13/2021 102  100 - 108 mmol/L Final    CO2 07/13/2021 32  21 - 32 mmol/L Final    ANION GAP 07/13/2021 6  4 - 13 mmol/L Final    BUN 07/13/2021 19  5 - 25 mg/dL Final    Creatinine 07/13/2021 1 09  0 60 - 1 30 mg/dL Final    Standardized to IDMS reference method    Glucose 07/13/2021 119  65 - 140 mg/dL Final    If the patient is fasting, the ADA then defines impaired fasting glucose as > 100 mg/dL and diabetes as > or equal to 123 mg/dL  Specimen collection should occur prior to Sulfasalazine administration due to the potential for falsely depressed results  Specimen collection should occur prior to Sulfapyridine administration due to the potential for falsely elevated results   Calcium 07/13/2021 10 0  8 3 - 10 1 mg/dL Final    AST 07/13/2021 21  5 - 45 U/L Final    Specimen collection should occur prior to Sulfasalazine administration due to the potential for falsely depressed results   ALT 07/13/2021 24  12 - 78 U/L Final    Specimen collection should occur prior to Sulfasalazine administration due to the potential for falsely depressed results       Alkaline Phosphatase 07/13/2021 71  46 - 116 U/L Final    Total Protein 07/13/2021 7 6  6 4 - 8 2 g/dL Final    Albumin 07/13/2021 4 0  3 5 - 5 0 g/dL Final    Total Bilirubin 07/13/2021 0 43  0 20 - 1 00 mg/dL Final    Use of this assay is not recommended for patients undergoing treatment with eltrombopag due to the potential for falsely elevated results      eGFR 07/13/2021 76  ml/min/1 73sq m Final

## 2022-07-06 DIAGNOSIS — M05.79 RHEUMATOID ARTHRITIS INVOLVING MULTIPLE SITES WITH POSITIVE RHEUMATOID FACTOR (HCC): ICD-10-CM

## 2022-07-06 DIAGNOSIS — M19.90 ARTHRITIS: ICD-10-CM

## 2022-07-06 RX ORDER — FOLIC ACID 1 MG/1
TABLET ORAL
Qty: 90 TABLET | Refills: 3 | Status: SHIPPED | OUTPATIENT
Start: 2022-07-06 | End: 2022-10-04

## 2022-07-06 RX ORDER — FOLIC ACID 1 MG/1
TABLET ORAL
Qty: 90 TABLET | Refills: 3 | Status: SHIPPED | OUTPATIENT
Start: 2022-07-06

## 2022-08-23 ENCOUNTER — TELEPHONE (OUTPATIENT)
Dept: OBGYN CLINIC | Facility: HOSPITAL | Age: 57
End: 2022-08-23

## 2022-08-23 DIAGNOSIS — M05.79 RHEUMATOID ARTHRITIS INVOLVING MULTIPLE SITES WITH POSITIVE RHEUMATOID FACTOR (HCC): ICD-10-CM

## 2022-08-23 RX ORDER — METHYLPREDNISOLONE 4 MG/1
TABLET ORAL
Qty: 90 TABLET | Refills: 0 | Status: SHIPPED | OUTPATIENT
Start: 2022-08-23

## 2022-08-23 NOTE — TELEPHONE ENCOUNTER
Patient sees Dr Chuck Booth      Patients wife is calling to request a prescription for prednisone        Patient uses [facility] on file

## 2022-09-01 DIAGNOSIS — M05.79 RHEUMATOID ARTHRITIS INVOLVING MULTIPLE SITES WITH POSITIVE RHEUMATOID FACTOR (HCC): ICD-10-CM

## 2022-09-01 DIAGNOSIS — M19.90 ARTHRITIS: ICD-10-CM

## 2022-10-18 ENCOUNTER — APPOINTMENT (OUTPATIENT)
Dept: LAB | Facility: HOSPITAL | Age: 57
End: 2022-10-18
Payer: COMMERCIAL

## 2022-10-18 DIAGNOSIS — M05.79 RHEUMATOID ARTHRITIS INVOLVING MULTIPLE SITES WITH POSITIVE RHEUMATOID FACTOR (HCC): Primary | ICD-10-CM

## 2022-10-21 ENCOUNTER — APPOINTMENT (OUTPATIENT)
Dept: LAB | Facility: HOSPITAL | Age: 57
End: 2022-10-21
Payer: COMMERCIAL

## 2022-10-21 DIAGNOSIS — M05.79 RHEUMATOID ARTHRITIS INVOLVING MULTIPLE SITES WITH POSITIVE RHEUMATOID FACTOR (HCC): ICD-10-CM

## 2022-10-25 ENCOUNTER — TELEPHONE (OUTPATIENT)
Dept: RHEUMATOLOGY | Facility: CLINIC | Age: 57
End: 2022-10-25

## 2022-10-25 ENCOUNTER — TELEPHONE (OUTPATIENT)
Dept: OBGYN CLINIC | Facility: HOSPITAL | Age: 57
End: 2022-10-25

## 2022-10-25 ENCOUNTER — OFFICE VISIT (OUTPATIENT)
Dept: RHEUMATOLOGY | Facility: CLINIC | Age: 57
End: 2022-10-25
Payer: COMMERCIAL

## 2022-10-25 VITALS
HEIGHT: 70 IN | WEIGHT: 139.4 LBS | SYSTOLIC BLOOD PRESSURE: 118 MMHG | DIASTOLIC BLOOD PRESSURE: 80 MMHG | BODY MASS INDEX: 19.96 KG/M2

## 2022-10-25 DIAGNOSIS — M05.79 RHEUMATOID ARTHRITIS INVOLVING MULTIPLE SITES WITH POSITIVE RHEUMATOID FACTOR (HCC): Primary | ICD-10-CM

## 2022-10-25 DIAGNOSIS — R21 RASH AND NONSPECIFIC SKIN ERUPTION: ICD-10-CM

## 2022-10-25 DIAGNOSIS — R79.82 ELEVATED C-REACTIVE PROTEIN (CRP): ICD-10-CM

## 2022-10-25 PROCEDURE — 99215 OFFICE O/P EST HI 40 MIN: CPT | Performed by: INTERNAL MEDICINE

## 2022-10-25 NOTE — TELEPHONE ENCOUNTER
Caller: Ned Madrigal    Doctor: Jayesh Buchanan    Reason for call: Asked what the first name of the patient was    Call back#: n/a

## 2022-10-25 NOTE — TELEPHONE ENCOUNTER
I would like to begin Enbrel for rheumatoid arthritis as he has persistent joint pain and stiffness on methotrexate and methylprednisolone  59EN subcut  sureclick once a week

## 2022-10-25 NOTE — PROGRESS NOTES
Assessment and Plan:   RA not doing well on MTX/Medrol  Will begin Enbrel weekly in combination with MTX  Derm  eval for left UE rash and NMSC surveillance given initiation of biologic therapy  Anti-inflammatory diet/exercise  Check labs every 12 weeks  F/u in 3-4 mos  or sooner if necessary  Rheumatic Disease Summary  As above    Here for f/u eval   Still with joint pain, swelling and stiffness  Also with elevated ESR/CRP  He is amenable to anti-TNF alpha therapy  Also with left UE rash  The following portions of the patient's history were reviewed and updated as appropriate: allergies, current medications, past family history, past medical history, past social history, past surgical history and problem list     Review of Systems:   Review of Systems   Constitutional: Positive for fatigue  HENT: Negative for mouth sores  Eyes: Negative for pain  Respiratory: Negative for shortness of breath  Cardiovascular: Negative for leg swelling  Musculoskeletal: Positive for arthralgias  Negative for joint swelling  Skin: Negative for rash  Neurological: Negative for weakness  Hematological: Negative for adenopathy  Psychiatric/Behavioral: Negative for sleep disturbance         Home Medications:    Current Outpatient Medications:   •  methylprednisolone (MEDROL) 4 mg tablet, TAKE 1 TABLET BY MOUTH EVERY DAY, Disp: 90 tablet, Rfl: 0  •  folic acid (FOLVITE) 1 mg tablet, TAKE 1 TABLET(1 MG) BY MOUTH DAILY AT BEDTIME, Disp: 90 tablet, Rfl: 3  •  folic acid (FOLVITE) 1 mg tablet, TAKE 1 TABLET(1 MG) BY MOUTH DAILY AT BEDTIME, Disp: 90 tablet, Rfl: 3  •  methotrexate 2 5 mg tablet, TAKE 4 TABLETS BY MOUTH IN THE MORNING AND 3 TABLETS IN THE AFTERNOON ONCE EVERY WEEK WITH BREAKFAST, Disp: 86 tablet, Rfl: 2  •  omeprazole (PriLOSEC) 20 mg delayed release capsule, Take 1 capsule (20 mg total) by mouth daily, Disp: 90 capsule, Rfl: 3  •  traMADol (ULTRAM) 50 mg tablet, Take 1 tablet (50 mg total) by mouth every 8 (eight) hours as needed for moderate pain, Disp: 30 tablet, Rfl: 0    Objective:    Vitals:    10/25/22 0921   BP: 118/80   Weight: 63 2 kg (139 lb 6 4 oz)   Height: 5' 10" (1 778 m)       Physical Exam  Constitutional:       General: He is not in acute distress  HENT:      Head: Normocephalic and atraumatic  Eyes:      Conjunctiva/sclera: Conjunctivae normal    Cardiovascular:      Rate and Rhythm: Normal rate and regular rhythm  Heart sounds: S1 normal and S2 normal      No friction rub  Pulmonary:      Effort: Pulmonary effort is normal  No respiratory distress  Breath sounds: Normal breath sounds  No wheezing, rhonchi or rales  Musculoskeletal:      Cervical back: Neck supple  Skin:     Coloration: Skin is not pale  Neurological:      Mental Status: He is alert  Mental status is at baseline  Psychiatric:         Mood and Affect: Mood normal          Behavior: Behavior normal          Reviewed labs and imaging  Imaging:   No results found  Labs:    Ancillary Orders on 10/21/2022   Component Date Value Ref Range Status   • WBC 10/21/2022 8 27  4 31 - 10 16 Thousand/uL Final   • RBC 10/21/2022 4 63  3 88 - 5 62 Million/uL Final   • Hemoglobin 10/21/2022 15 2  12 0 - 17 0 g/dL Final   • Hematocrit 10/21/2022 46 4  36 5 - 49 3 % Final   • MCV 10/21/2022 100 (A) 82 - 98 fL Final   • MCH 10/21/2022 32 8  26 8 - 34 3 pg Final   • MCHC 10/21/2022 32 8  31 4 - 37 4 g/dL Final   • RDW 10/21/2022 14 6  11 6 - 15 1 % Final   • MPV 10/21/2022 11 1  8 9 - 12 7 fL Final   • Platelets 50/47/7716 219  149 - 390 Thousands/uL Final   • nRBC 10/21/2022 0  /100 WBCs Final   • Neutrophils Relative 10/21/2022 45  43 - 75 % Final   • Immat GRANS % 10/21/2022 0  0 - 2 % Final   • Lymphocytes Relative 10/21/2022 43  14 - 44 % Final   • Monocytes Relative 10/21/2022 10  4 - 12 % Final   • Eosinophils Relative 10/21/2022 2  0 - 6 % Final   • Basophils Relative 10/21/2022 0  0 - 1 % Final   • Neutrophils Absolute 10/21/2022 3 60  1 85 - 7 62 Thousands/µL Final   • Immature Grans Absolute 10/21/2022 0 03  0 00 - 0 20 Thousand/uL Final   • Lymphocytes Absolute 10/21/2022 3 58  0 60 - 4 47 Thousands/µL Final   • Monocytes Absolute 10/21/2022 0 84  0 17 - 1 22 Thousand/µL Final   • Eosinophils Absolute 10/21/2022 0 19  0 00 - 0 61 Thousand/µL Final   • Basophils Absolute 10/21/2022 0 03  0 00 - 0 10 Thousands/µL Final   • Sodium 10/21/2022 143  135 - 147 mmol/L Final   • Potassium 10/21/2022 4 5  3 5 - 5 3 mmol/L Final   • Chloride 10/21/2022 102  96 - 108 mmol/L Final   • CO2 10/21/2022 35 (A) 21 - 32 mmol/L Final   • ANION GAP 10/21/2022 6  4 - 13 mmol/L Final   • BUN 10/21/2022 15  5 - 25 mg/dL Final   • Creatinine 10/21/2022 0 93  0 60 - 1 30 mg/dL Final    Standardized to IDMS reference method   • Glucose 10/21/2022 103  65 - 140 mg/dL Final    If the patient is fasting, the ADA then defines impaired fasting glucose as > 100 mg/dL and diabetes as > or equal to 123 mg/dL  Specimen collection should occur prior to Sulfasalazine administration due to the potential for falsely depressed results  Specimen collection should occur prior to Sulfapyridine administration due to the potential for falsely elevated results  • Calcium 10/21/2022 9 6  8 3 - 10 1 mg/dL Final   • AST 10/21/2022 16  5 - 45 U/L Final    Specimen collection should occur prior to Sulfasalazine administration due to the potential for falsely depressed results  • ALT 10/21/2022 20  12 - 78 U/L Final    Specimen collection should occur prior to Sulfasalazine administration due to the potential for falsely depressed results      • Alkaline Phosphatase 10/21/2022 64  46 - 116 U/L Final   • Total Protein 10/21/2022 7 1  6 4 - 8 4 g/dL Final   • Albumin 10/21/2022 3 6  3 5 - 5 0 g/dL Final   • Total Bilirubin 10/21/2022 0 42  0 20 - 1 00 mg/dL Final    Use of this assay is not recommended for patients undergoing treatment with eltrombopag due to the potential for falsely elevated results     • eGFR 10/21/2022 91  ml/min/1 73sq m Final   • CRP 10/21/2022 3 5 (A) <3 0 mg/L Final   Ancillary Orders on 07/15/2022   Component Date Value Ref Range Status   • Sed Rate 10/18/2022 36 (A) 0 - 19 mm/hour Final   Ancillary Orders on 05/06/2022   Component Date Value Ref Range Status   • WBC 06/20/2022 7 74  4 31 - 10 16 Thousand/uL Final   • RBC 06/20/2022 4 30  3 88 - 5 62 Million/uL Final   • Hemoglobin 06/20/2022 14 0  12 0 - 17 0 g/dL Final   • Hematocrit 06/20/2022 41 9  36 5 - 49 3 % Final   • MCV 06/20/2022 97  82 - 98 fL Final   • MCH 06/20/2022 32 6  26 8 - 34 3 pg Final   • MCHC 06/20/2022 33 4  31 4 - 37 4 g/dL Final   • RDW 06/20/2022 13 9  11 6 - 15 1 % Final   • MPV 06/20/2022 10 8  8 9 - 12 7 fL Final   • Platelets 60/03/6320 194  149 - 390 Thousands/uL Final   • nRBC 06/20/2022 0  /100 WBCs Final   • Neutrophils Relative 06/20/2022 72  43 - 75 % Final   • Immat GRANS % 06/20/2022 0  0 - 2 % Final   • Lymphocytes Relative 06/20/2022 19  14 - 44 % Final   • Monocytes Relative 06/20/2022 8  4 - 12 % Final   • Eosinophils Relative 06/20/2022 1  0 - 6 % Final   • Basophils Relative 06/20/2022 0  0 - 1 % Final   • Neutrophils Absolute 06/20/2022 5 57  1 85 - 7 62 Thousands/µL Final   • Immature Grans Absolute 06/20/2022 0 01  0 00 - 0 20 Thousand/uL Final   • Lymphocytes Absolute 06/20/2022 1 48  0 60 - 4 47 Thousands/µL Final   • Monocytes Absolute 06/20/2022 0 62  0 17 - 1 22 Thousand/µL Final   • Eosinophils Absolute 06/20/2022 0 04  0 00 - 0 61 Thousand/µL Final   • Basophils Absolute 06/20/2022 0 02  0 00 - 0 10 Thousands/µL Final   • Sodium 06/20/2022 142  136 - 145 mmol/L Final   • Potassium 06/20/2022 3 9  3 5 - 5 3 mmol/L Final   • Chloride 06/20/2022 103  100 - 108 mmol/L Final   • CO2 06/20/2022 30  21 - 32 mmol/L Final   • ANION GAP 06/20/2022 9  4 - 13 mmol/L Final   • BUN 06/20/2022 18  5 - 25 mg/dL Final   • Creatinine 06/20/2022 0 91  0 60 - 1 30 mg/dL Final    Standardized to IDMS reference method   • Glucose 06/20/2022 82  65 - 140 mg/dL Final    If the patient is fasting, the ADA then defines impaired fasting glucose as > 100 mg/dL and diabetes as > or equal to 123 mg/dL  Specimen collection should occur prior to Sulfasalazine administration due to the potential for falsely depressed results  Specimen collection should occur prior to Sulfapyridine administration due to the potential for falsely elevated results  • Calcium 06/20/2022 9 3  8 3 - 10 1 mg/dL Final   • AST 06/20/2022 17  5 - 45 U/L Final    Specimen collection should occur prior to Sulfasalazine administration due to the potential for falsely depressed results  • ALT 06/20/2022 19  12 - 78 U/L Final    Specimen collection should occur prior to Sulfasalazine administration due to the potential for falsely depressed results  • Alkaline Phosphatase 06/20/2022 65  46 - 116 U/L Final   • Total Protein 06/20/2022 6 9  6 4 - 8 2 g/dL Final   • Albumin 06/20/2022 3 6  3 5 - 5 0 g/dL Final   • Total Bilirubin 06/20/2022 0 34  0 20 - 1 00 mg/dL Final    Use of this assay is not recommended for patients undergoing treatment with eltrombopag due to the potential for falsely elevated results     • eGFR 06/20/2022 93  ml/min/1 73sq m Final   • CRP 06/20/2022 12 0 (A) <3 0 mg/L Final   Ancillary Orders on 04/22/2022   Component Date Value Ref Range Status   • WBC 10/18/2022 9 13  4 31 - 10 16 Thousand/uL Final   • RBC 10/18/2022 4 68  3 88 - 5 62 Million/uL Final   • Hemoglobin 10/18/2022 15 4  12 0 - 17 0 g/dL Final   • Hematocrit 10/18/2022 47 3  36 5 - 49 3 % Final   • MCV 10/18/2022 101 (A) 82 - 98 fL Final   • MCH 10/18/2022 32 9  26 8 - 34 3 pg Final   • MCHC 10/18/2022 32 6  31 4 - 37 4 g/dL Final   • RDW 10/18/2022 14 6  11 6 - 15 1 % Final   • MPV 10/18/2022 11 5  8 9 - 12 7 fL Final   • Platelets 73/99/1651 221  149 - 390 Thousands/uL Final   • nRBC 10/18/2022 0  /100 WBCs Final   • Neutrophils Relative 10/18/2022 57  43 - 75 % Final   • Immat GRANS % 10/18/2022 0  0 - 2 % Final   • Lymphocytes Relative 10/18/2022 32  14 - 44 % Final   • Monocytes Relative 10/18/2022 9  4 - 12 % Final   • Eosinophils Relative 10/18/2022 2  0 - 6 % Final   • Basophils Relative 10/18/2022 0  0 - 1 % Final   • Neutrophils Absolute 10/18/2022 5 12  1 85 - 7 62 Thousands/µL Final   • Immature Grans Absolute 10/18/2022 0 03  0 00 - 0 20 Thousand/uL Final   • Lymphocytes Absolute 10/18/2022 2 92  0 60 - 4 47 Thousands/µL Final   • Monocytes Absolute 10/18/2022 0 84  0 17 - 1 22 Thousand/µL Final   • Eosinophils Absolute 10/18/2022 0 20  0 00 - 0 61 Thousand/µL Final   • Basophils Absolute 10/18/2022 0 02  0 00 - 0 10 Thousands/µL Final   • Sed Rate 06/20/2022 34 (A) 0 - 19 mm/hour Final   Ancillary Orders on 12/24/2021   Component Date Value Ref Range Status   • Sodium 02/08/2022 140  136 - 145 mmol/L Final   • Potassium 02/08/2022 5 1  3 5 - 5 3 mmol/L Final   • Chloride 02/08/2022 104  100 - 108 mmol/L Final   • CO2 02/08/2022 29  21 - 32 mmol/L Final   • ANION GAP 02/08/2022 7  4 - 13 mmol/L Final   • BUN 02/08/2022 13  5 - 25 mg/dL Final   • Creatinine 02/08/2022 0 90  0 60 - 1 30 mg/dL Final    Standardized to IDMS reference method   • Glucose 02/08/2022 94  65 - 140 mg/dL Final    If the patient is fasting, the ADA then defines impaired fasting glucose as > 100 mg/dL and diabetes as > or equal to 123 mg/dL  Specimen collection should occur prior to Sulfasalazine administration due to the potential for falsely depressed results  Specimen collection should occur prior to Sulfapyridine administration due to the potential for falsely elevated results  • Calcium 02/08/2022 9 3  8 3 - 10 1 mg/dL Final   • AST 02/08/2022 22  5 - 45 U/L Final    Specimen collection should occur prior to Sulfasalazine administration due to the potential for falsely depressed results      • ALT 02/08/2022 27  12 - 78 U/L Final Specimen collection should occur prior to Sulfasalazine administration due to the potential for falsely depressed results  • Alkaline Phosphatase 02/08/2022 57  46 - 116 U/L Final   • Total Protein 02/08/2022 6 8  6 4 - 8 2 g/dL Final   • Albumin 02/08/2022 3 9  3 5 - 5 0 g/dL Final   • Total Bilirubin 02/08/2022 0 44  0 20 - 1 00 mg/dL Final    Use of this assay is not recommended for patients undergoing treatment with eltrombopag due to the potential for falsely elevated results     • eGFR 02/08/2022 95  ml/min/1 73sq m Final   Office Visit on 11/09/2021   Component Date Value Ref Range Status   • WBC 02/08/2022 5 99  4 31 - 10 16 Thousand/uL Final   • RBC 02/08/2022 4 36  3 88 - 5 62 Million/uL Final   • Hemoglobin 02/08/2022 14 1  12 0 - 17 0 g/dL Final   • Hematocrit 02/08/2022 43 3  36 5 - 49 3 % Final   • MCV 02/08/2022 99 (A) 82 - 98 fL Final   • MCH 02/08/2022 32 3  26 8 - 34 3 pg Final   • MCHC 02/08/2022 32 6  31 4 - 37 4 g/dL Final   • RDW 02/08/2022 14 0  11 6 - 15 1 % Final   • MPV 02/08/2022 10 3  8 9 - 12 7 fL Final   • Platelets 29/99/6079 245  149 - 390 Thousands/uL Final   • nRBC 02/08/2022 0  /100 WBCs Final   • Neutrophils Relative 02/08/2022 67  43 - 75 % Final   • Immat GRANS % 02/08/2022 0  0 - 2 % Final   • Lymphocytes Relative 02/08/2022 25  14 - 44 % Final   • Monocytes Relative 02/08/2022 7  4 - 12 % Final   • Eosinophils Relative 02/08/2022 1  0 - 6 % Final   • Basophils Relative 02/08/2022 0  0 - 1 % Final   • Neutrophils Absolute 02/08/2022 4 00  1 85 - 7 62 Thousands/µL Final   • Immature Grans Absolute 02/08/2022 0 01  0 00 - 0 20 Thousand/uL Final   • Lymphocytes Absolute 02/08/2022 1 48  0 60 - 4 47 Thousands/µL Final   • Monocytes Absolute 02/08/2022 0 43  0 17 - 1 22 Thousand/µL Final   • Eosinophils Absolute 02/08/2022 0 06  0 00 - 0 61 Thousand/µL Final   • Basophils Absolute 02/08/2022 0 01  0 00 - 0 10 Thousands/µL Final   • Sed Rate 02/08/2022 6  0 - 19 mm/hour Final   • CRP 02/08/2022 <3 0  <3 0 mg/L Final

## 2022-10-25 NOTE — PATIENT INSTRUCTIONS
Continue the methotrexate 7 tabs once a week and folic acid every day  I began the insurance authorization process for Enbrel  Continue to have your blood work done every 3 months

## 2022-10-26 DIAGNOSIS — M05.79 RHEUMATOID ARTHRITIS INVOLVING MULTIPLE SITES WITH POSITIVE RHEUMATOID FACTOR (HCC): Primary | ICD-10-CM

## 2022-10-31 NOTE — TELEPHONE ENCOUNTER
Caller: [place] // [facility]     Doctor:     Reason for call: confirming patient phone number // to verify delivery of injection    Call back#:

## 2022-11-03 ENCOUNTER — TELEPHONE (OUTPATIENT)
Dept: DERMATOLOGY | Age: 57
End: 2022-11-03

## 2022-11-03 NOTE — TELEPHONE ENCOUNTER
Called pt to schedule consult from referral, N/A, lm to cb  Provider Comment:  L- hand/forearm rash  I would also like a baseline eval for NMSC surveillance as we are beginning Enbrel treatment for rheumatoid arthritis  (pt has ASAP referral)

## 2022-11-08 NOTE — TELEPHONE ENCOUNTER
Caller: Tien Fisher    Doctor: Dominik Link    Reason for call: Patients wife would like a call back regarding his Enbrel injection  He has questions about this before he starts  She would like a call back around 4 or 5        Call back#: [phone]

## 2022-11-14 DIAGNOSIS — M05.79 RHEUMATOID ARTHRITIS INVOLVING MULTIPLE SITES WITH POSITIVE RHEUMATOID FACTOR (HCC): ICD-10-CM

## 2022-11-14 RX ORDER — METHYLPREDNISOLONE 4 MG/1
TABLET ORAL
Qty: 90 TABLET | Refills: 0 | Status: SHIPPED | OUTPATIENT
Start: 2022-11-14

## 2022-11-15 DIAGNOSIS — M19.90 ARTHRITIS: ICD-10-CM

## 2022-11-15 DIAGNOSIS — M05.79 RHEUMATOID ARTHRITIS INVOLVING MULTIPLE SITES WITH POSITIVE RHEUMATOID FACTOR (HCC): ICD-10-CM

## 2022-11-17 ENCOUNTER — TELEPHONE (OUTPATIENT)
Dept: RHEUMATOLOGY | Facility: CLINIC | Age: 57
End: 2022-11-17

## 2022-11-17 NOTE — TELEPHONE ENCOUNTER
Caller: Patient    Doctor/Office: Yvonne Parsons asked to speak to: Nurse     Call was transferred to: Nurse

## 2022-11-17 NOTE — TELEPHONE ENCOUNTER
Patient was called and a vm was left asking patient if he could call with his questions about his Enbrel

## 2023-01-09 NOTE — TELEPHONE ENCOUNTER
Caller: [facility]    Doctor: Franck Aviles    Reason for call: Pharmacy has not been able to get in touch with the patient  Is he to continue with Enbrel?     Call back#: [phone]

## 2023-02-12 DIAGNOSIS — M05.79 RHEUMATOID ARTHRITIS INVOLVING MULTIPLE SITES WITH POSITIVE RHEUMATOID FACTOR (HCC): ICD-10-CM

## 2023-02-13 RX ORDER — METHYLPREDNISOLONE 4 MG/1
TABLET ORAL
Qty: 90 TABLET | Refills: 0 | Status: SHIPPED | OUTPATIENT
Start: 2023-02-13

## 2023-05-30 ENCOUNTER — TELEPHONE (OUTPATIENT)
Dept: FAMILY MEDICINE CLINIC | Facility: CLINIC | Age: 58
End: 2023-05-30

## 2023-05-30 NOTE — TELEPHONE ENCOUNTER
Sent pt mychart message -- pt has not been seen in over 2 years by office -- requested call to schedule appt if pt would like to continue seeing Dr Alvaro Redmond

## 2023-05-31 ENCOUNTER — TELEPHONE (OUTPATIENT)
Dept: RHEUMATOLOGY | Facility: CLINIC | Age: 58
End: 2023-05-31

## 2023-06-07 ENCOUNTER — APPOINTMENT (OUTPATIENT)
Dept: LAB | Facility: HOSPITAL | Age: 58
End: 2023-06-07
Payer: COMMERCIAL

## 2023-06-07 DIAGNOSIS — M05.79 RHEUMATOID ARTHRITIS INVOLVING MULTIPLE SITES WITH POSITIVE RHEUMATOID FACTOR (HCC): ICD-10-CM

## 2023-06-07 LAB
ALBUMIN SERPL BCP-MCNC: 4.4 G/DL (ref 3.5–5)
ALP SERPL-CCNC: 54 U/L (ref 34–104)
ALT SERPL W P-5'-P-CCNC: 13 U/L (ref 7–52)
ANION GAP SERPL CALCULATED.3IONS-SCNC: 5 MMOL/L (ref 4–13)
AST SERPL W P-5'-P-CCNC: 19 U/L (ref 13–39)
BASOPHILS # BLD AUTO: 0.02 THOUSANDS/ÂΜL (ref 0–0.1)
BASOPHILS NFR BLD AUTO: 0 % (ref 0–1)
BILIRUB SERPL-MCNC: 0.55 MG/DL (ref 0.2–1)
BUN SERPL-MCNC: 12 MG/DL (ref 5–25)
CALCIUM SERPL-MCNC: 10.2 MG/DL (ref 8.4–10.2)
CHLORIDE SERPL-SCNC: 104 MMOL/L (ref 96–108)
CO2 SERPL-SCNC: 31 MMOL/L (ref 21–32)
CREAT SERPL-MCNC: 0.82 MG/DL (ref 0.6–1.3)
CRP SERPL QL: 3.6 MG/L
EOSINOPHIL # BLD AUTO: 0.2 THOUSAND/ÂΜL (ref 0–0.61)
EOSINOPHIL NFR BLD AUTO: 3 % (ref 0–6)
ERYTHROCYTE [DISTWIDTH] IN BLOOD BY AUTOMATED COUNT: 14.2 % (ref 11.6–15.1)
GFR SERPL CREATININE-BSD FRML MDRD: 98 ML/MIN/1.73SQ M
GLUCOSE P FAST SERPL-MCNC: 115 MG/DL (ref 65–99)
HBV SURFACE AG SER QL: NORMAL
HCT VFR BLD AUTO: 45.2 % (ref 36.5–49.3)
HCV AB SER QL: NORMAL
HGB BLD-MCNC: 14.8 G/DL (ref 12–17)
IMM GRANULOCYTES # BLD AUTO: 0.01 THOUSAND/UL (ref 0–0.2)
IMM GRANULOCYTES NFR BLD AUTO: 0 % (ref 0–2)
LYMPHOCYTES # BLD AUTO: 2.08 THOUSANDS/ÂΜL (ref 0.6–4.47)
LYMPHOCYTES NFR BLD AUTO: 30 % (ref 14–44)
MCH RBC QN AUTO: 32.6 PG (ref 26.8–34.3)
MCHC RBC AUTO-ENTMCNC: 32.7 G/DL (ref 31.4–37.4)
MCV RBC AUTO: 100 FL (ref 82–98)
MONOCYTES # BLD AUTO: 0.71 THOUSAND/ÂΜL (ref 0.17–1.22)
MONOCYTES NFR BLD AUTO: 10 % (ref 4–12)
NEUTROPHILS # BLD AUTO: 4.01 THOUSANDS/ÂΜL (ref 1.85–7.62)
NEUTS SEG NFR BLD AUTO: 57 % (ref 43–75)
NRBC BLD AUTO-RTO: 0 /100 WBCS
PLATELET # BLD AUTO: 215 THOUSANDS/UL (ref 149–390)
PMV BLD AUTO: 11.7 FL (ref 8.9–12.7)
POTASSIUM SERPL-SCNC: 5 MMOL/L (ref 3.5–5.3)
PROT SERPL-MCNC: 7.2 G/DL (ref 6.4–8.4)
RBC # BLD AUTO: 4.54 MILLION/UL (ref 3.88–5.62)
SODIUM SERPL-SCNC: 140 MMOL/L (ref 135–147)
WBC # BLD AUTO: 7.03 THOUSAND/UL (ref 4.31–10.16)

## 2023-06-07 PROCEDURE — 86480 TB TEST CELL IMMUN MEASURE: CPT

## 2023-06-07 PROCEDURE — 86803 HEPATITIS C AB TEST: CPT

## 2023-06-07 PROCEDURE — 87340 HEPATITIS B SURFACE AG IA: CPT

## 2023-06-08 LAB
GAMMA INTERFERON BACKGROUND BLD IA-ACNC: 0.03 IU/ML
M TB IFN-G BLD-IMP: ABNORMAL
M TB IFN-G CD4+ BCKGRND COR BLD-ACNC: -0.01 IU/ML
M TB IFN-G CD4+ BCKGRND COR BLD-ACNC: 0 IU/ML
MITOGEN IGNF BCKGRD COR BLD-ACNC: 0.3 IU/ML

## 2023-06-13 ENCOUNTER — TELEPHONE (OUTPATIENT)
Dept: FAMILY MEDICINE CLINIC | Facility: CLINIC | Age: 58
End: 2023-06-13

## 2023-06-13 NOTE — TELEPHONE ENCOUNTER
Please remove Dr Purcell Kawasaki as PCP  Pt is now under the care of Jase Hernandez,   with [address] [facility]

## 2023-06-22 NOTE — TELEPHONE ENCOUNTER
06/22/23 5:22 PM     The office's request has been received, reviewed, and the patient chart updated  The PCP has successfully been removed with a patient attribution note  This message will now be completed      Thank you  Marjorie Hong

## 2023-07-01 DIAGNOSIS — M05.79 RHEUMATOID ARTHRITIS INVOLVING MULTIPLE SITES WITH POSITIVE RHEUMATOID FACTOR (HCC): ICD-10-CM

## 2023-07-03 RX ORDER — METHYLPREDNISOLONE 4 MG/1
4 TABLET ORAL DAILY
Qty: 90 TABLET | Refills: 0 | Status: SHIPPED | OUTPATIENT
Start: 2023-07-03

## 2023-07-10 DIAGNOSIS — M05.79 RHEUMATOID ARTHRITIS INVOLVING MULTIPLE SITES WITH POSITIVE RHEUMATOID FACTOR (HCC): ICD-10-CM

## 2023-07-10 DIAGNOSIS — M19.90 ARTHRITIS: ICD-10-CM

## 2023-07-10 RX ORDER — FOLIC ACID 1 MG/1
TABLET ORAL
Qty: 90 TABLET | Refills: 3 | Status: SHIPPED | OUTPATIENT
Start: 2023-07-10

## 2023-07-23 DIAGNOSIS — M05.79 RHEUMATOID ARTHRITIS INVOLVING MULTIPLE SITES WITH POSITIVE RHEUMATOID FACTOR (HCC): ICD-10-CM

## 2023-07-24 RX ORDER — OMEPRAZOLE 20 MG/1
20 CAPSULE, DELAYED RELEASE ORAL DAILY
Qty: 90 CAPSULE | Refills: 3 | Status: SHIPPED | OUTPATIENT
Start: 2023-07-24

## 2023-10-21 DIAGNOSIS — M05.79 RHEUMATOID ARTHRITIS INVOLVING MULTIPLE SITES WITH POSITIVE RHEUMATOID FACTOR (HCC): ICD-10-CM

## 2023-10-22 RX ORDER — METHYLPREDNISOLONE 4 MG/1
4 TABLET ORAL DAILY
Qty: 90 TABLET | Refills: 0 | Status: SHIPPED | OUTPATIENT
Start: 2023-10-22

## 2023-11-06 ENCOUNTER — TELEPHONE (OUTPATIENT)
Dept: PAIN MEDICINE | Facility: CLINIC | Age: 58
End: 2023-11-06

## (undated) DEVICE — TUBING ARTHROSCOPIC WAVE  MAIN PUMP

## (undated) DEVICE — LIGHT HANDLE COVER SLEEVE DISP BLUE STELLAR

## (undated) DEVICE — SCD SEQUENTIAL COMPRESSION COMFORT SLEEVE MEDIUM KNEE LENGTH: Brand: KENDALL SCD

## (undated) DEVICE — 3M™ STERI-STRIP™ REINFORCED ADHESIVE SKIN CLOSURES, R1547, 1/2 IN X 4 IN (12 MM X 100 MM), 6 STRIPS/ENVELOPE: Brand: 3M™ STERI-STRIP™

## (undated) DEVICE — SUT PDS II 0 CT-1 27 IN Z340H

## (undated) DEVICE — ABDOMINAL PAD: Brand: DERMACEA

## (undated) DEVICE — U-DRAPE: Brand: CONVERTORS

## (undated) DEVICE — DRAPE EQUIPMENT RF WAND

## (undated) DEVICE — INTENDED FOR TISSUE SEPARATION, AND OTHER PROCEDURES THAT REQUIRE A SHARP SURGICAL BLADE TO PUNCTURE OR CUT.: Brand: BARD-PARKER ® CARBON RIB-BACK BLADES

## (undated) DEVICE — SHOULDER SUSPENSION KIT 6 PER BOX

## (undated) DEVICE — GAUZE SPONGES,16 PLY: Brand: CURITY

## (undated) DEVICE — OCCLUSIVE GAUZE STRIP,3% BISMUTH TRIBROMOPHENATE IN PETROLATUM BLEND: Brand: XEROFORM

## (undated) DEVICE — THREADED CLEAR CANNULA WITH OBTURATOR 5.5MM X 75MM

## (undated) DEVICE — SUT ETHILON 3-0 PS-1 18 IN 1663H

## (undated) DEVICE — TUBING SUCTION 5MM X 12 FT

## (undated) DEVICE — DRAPE SHEET THREE QUARTER

## (undated) DEVICE — PROBE ABLATION  APOLLO RF 90 DEG MULTI PORT

## (undated) DEVICE — BETHLEHEM UNIVERSAL  ARTHRO PK: Brand: CARDINAL HEALTH

## (undated) DEVICE — BURR RND 4MM 13CM 8 FLUTE COOLCUT

## (undated) DEVICE — CHLORAPREP HI-LITE 26ML ORANGE

## (undated) DEVICE — 3M™ MICROFOAM™ SURGICAL TAPE 4 ROLLS/CARTON 6 CARTONS/CASE 1528-3: Brand: 3M™ MICROFOAM™

## (undated) DEVICE — ARTHROSCOPY FLOOR MAT

## (undated) DEVICE — Device

## (undated) DEVICE — IMPERVIOUS STOCKINETTE: Brand: DEROYAL